# Patient Record
Sex: MALE | Race: BLACK OR AFRICAN AMERICAN | URBAN - METROPOLITAN AREA
[De-identification: names, ages, dates, MRNs, and addresses within clinical notes are randomized per-mention and may not be internally consistent; named-entity substitution may affect disease eponyms.]

---

## 2021-08-10 ENCOUNTER — EXTERNAL NURSING HOME DOCUMENTATION (OUTPATIENT)
Dept: INTERNAL MEDICINE CLINIC | Age: 56
End: 2021-08-10

## 2021-08-10 DIAGNOSIS — I50.9 CONGESTIVE HEART FAILURE, UNSPECIFIED HF CHRONICITY, UNSPECIFIED HEART FAILURE TYPE (HCC): ICD-10-CM

## 2021-08-10 DIAGNOSIS — F41.9 ANXIETY: Primary | ICD-10-CM

## 2021-08-10 DIAGNOSIS — F10.10 ETOH ABUSE: ICD-10-CM

## 2021-08-10 DIAGNOSIS — I73.9 PAD (PERIPHERAL ARTERY DISEASE) (HCC): Primary | ICD-10-CM

## 2021-08-10 DIAGNOSIS — E78.2 MIXED HYPERLIPIDEMIA: ICD-10-CM

## 2021-08-10 DIAGNOSIS — R52 PAIN: ICD-10-CM

## 2021-08-10 DIAGNOSIS — I73.1 THROMBOANGIITIS OBLITERANS (BUERGER'S DISEASE) (HCC): ICD-10-CM

## 2021-08-10 PROCEDURE — 99306 1ST NF CARE HIGH MDM 50: CPT | Performed by: INTERNAL MEDICINE

## 2021-08-10 RX ORDER — LORAZEPAM 0.5 MG/1
0.5 TABLET ORAL
Qty: 28 TABLET | Refills: 0 | Status: SHIPPED | OUTPATIENT
Start: 2021-08-10 | End: 2021-08-17

## 2021-08-10 RX ORDER — OXYCODONE AND ACETAMINOPHEN 5; 325 MG/1; MG/1
1 TABLET ORAL
Qty: 15 TABLET | Refills: 0 | Status: SHIPPED | OUTPATIENT
Start: 2021-08-10 | End: 2021-08-15

## 2021-08-10 NOTE — PROGRESS NOTES
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 on 07/22 with shortness of breath and bilateral lower extremity edema with concern for ischemia. The patient had a recent hospital stay where he was diagnosed with CHF and Buerger's disease and had bilateral lower extremity ulceration, right more than left and had multiple procedure with improvement in the wound. He also stopped taking all of his medications over the past two months and has been depressed. In the emergency room, he was found to have troponin of 0.072 and BNP 13,279. Cardiologist was consulted and echocardiogram done showed his ejection fraction 10 to 15% only with severe global hypokinesia, mild LVH and right ventricular systolic function reduction. Vascular consulted for bilateral lower extremity ischemia. ZOHREH was normal and ischemia is thought to be related to low flow severe cardiomyopathy. He was diuresed in the hospital and he was started on dobutamine drip, as well as heparin drip over night. The patient became very agitated and he received Ativan as needed. He had increased shortness of breath and was placed on BiPAP. He then had apneic episode required a sternal rub and that aroused the patient. He was transferred to ICU for further management. Cardiac cath was done showed normal coronary with mild diffuse disease. He was advised to follow up with cardiologist as an outpatient. For his depression he was seen by psychiatrist and low dose of sertraline started along with hydroxyzine. Has history of alcohol abuse. He was placed on Ativan as needed. He was also placed on nicotine patch. He was stabilized and was transferred to [facility].   He is doing well in the rehab, but complaining of leg pain and anxiety. Past Medical History:  Hypertension, history of alcohol abuse, peripheral vascular disease with Buerger's disease and depression. Past Surgical History:  Wound debridement. Allergies:  He is allergic to codeine and lisinopril. Social History:  The patient smokes every day for many years. He drinks alcohol regularly. He lives at home. Medications:  He is on folic acid 1 mg tablet once a day, thiamine 100 mg tablets once a day, spironolactone 25 mg once a day, Zoloft 50 mg half tablet daily, gabapentin 200 mg three times a day, losartan 12.5 mg once a day, Bumex 1 mg once a day, Zofran 4 mg every eight hours as needed, lorazepam 1 mg every four hours as needed, NicoDerm 14 mg patch every day, hydroxyzine 10 mg twice a day as needed, Imodium 4 mg every six hours as needed, potassium chloride 20 mEq once a day as needed, vitamin D3 1000 units once a day. She is also using lovastatin 20 mg at bedtime, aspirin 81 mg once a day, Toprol XL 12.5 mg once a day. Review of Systems:  Complete review of systems done. Right now essentially significant for leg pain and anxiety. Physical Examination:    General:  This is a pleasant male, not in apparent distress. VITALS:  T:  98 degrees Fahrenheit. P:  70 per minute. BP:  120/80 mmHg. SaO2:  95% on room air. HEENT:  No abnormality detected. NECK:  Supple, no JVD, no carotid bruits, no thyromegaly. CHEST:  Chest is clear to auscultation bilaterally. No rales, no rhonchi. CARDIOVASCULAR:  S1, S2 normal.  Regular rate and rhythm. ABDOMEN:  Soft, nontender, nondistended, bowel sounds present. EXTREMITIES:  No leg edema present. Dorsalis pedis pulse with a poor flow. NEUROLOGICAL:  Alert and oriented x3. Cranial nerves II through XII grossly intact. Motor 5/5 bilaterally. Sensory within normal limits. Assessment and Plan:   1.  Acute hypoxic respiratory failure status post BiPAP placement. Right now off of BiPAP. He is breathing great. 2. Acute systolic heart failure with reduced ejection fraction. Recent echocardiogram showed ejection fraction 10 to 15%. He has ischemic cardiomyopathy. Cardiac cath was negative. He was continued with metoprolol, losartan and spironolactone. Lisinopril was discontinued due to allergy. The patient may need cardiac MRI, but he will follow up with cardiologist as an outpatient. 3. Non-sustained SVT. The patient to maintain magnesium level over 2 and potassium level has to be over 4.   4. Non-ST elevation MI. Cardiac cath done showed no occlusion. Medical management. He is to continue aspirin and statin and beta-blocker. 5. Severe PVD with Buerger's disease with foot ulceration. The patient will be on aspirin, gabapentin and Percocet as needed. 6. Depression. The patient is placed on low dose sertraline. We will continue it. We will also put him on lorazepam 0.5 mg every six hours as needed. 7. Alcohol abuse. The patient is doing well. He will be on lorazepam as needed. He is also on thiamine and now folic acid. THIS IS NOT A COMPLETE MEDICAL RECORD ON THIS PATIENT.    THIS IS A PATIENT AT [facility].    PLEASE CONTACT THE FACILITY LISTED BELOW FOR MORE INFORMATION ON THIS PATIENT.    THE COMPLETE RECORD RESIDES WITH THIS LONG TERM CARE FACILITY

## 2021-08-10 NOTE — TELEPHONE ENCOUNTER
Nurse at [facility] requests medication refill. Patient currently under care of Dr. Domingo Goel at [facility].

## 2021-08-20 ENCOUNTER — OFFICE VISIT (OUTPATIENT)
Dept: INTERNAL MEDICINE CLINIC | Age: 56
End: 2021-08-20

## 2021-08-20 DIAGNOSIS — K08.89 PAIN, DENTAL: Primary | ICD-10-CM

## 2021-08-20 DIAGNOSIS — R52 PAIN: ICD-10-CM

## 2021-08-20 DIAGNOSIS — I73.1 THROMBOANGIITIS OBLITERANS (BUERGER'S DISEASE) (HCC): ICD-10-CM

## 2021-08-20 DIAGNOSIS — I73.9 PAD (PERIPHERAL ARTERY DISEASE) (HCC): ICD-10-CM

## 2021-08-20 DIAGNOSIS — I50.9 CONGESTIVE HEART FAILURE, UNSPECIFIED HF CHRONICITY, UNSPECIFIED HEART FAILURE TYPE (HCC): ICD-10-CM

## 2021-08-20 DIAGNOSIS — E78.2 MIXED HYPERLIPIDEMIA: ICD-10-CM

## 2021-08-20 DIAGNOSIS — F41.9 ANXIETY: ICD-10-CM

## 2021-08-20 DIAGNOSIS — F10.10 ETOH ABUSE: ICD-10-CM

## 2021-08-20 PROCEDURE — 99309 SBSQ NF CARE MODERATE MDM 30: CPT | Performed by: NURSE PRACTITIONER

## 2021-08-20 RX ORDER — OXYCODONE AND ACETAMINOPHEN 5; 325 MG/1; MG/1
1 TABLET ORAL
Qty: 30 TABLET | Refills: 0 | Status: SHIPPED | OUTPATIENT
Start: 2021-08-20 | End: 2021-09-03 | Stop reason: SDUPTHER

## 2021-08-20 RX ORDER — LORAZEPAM 0.5 MG/1
0.5 TABLET ORAL
Qty: 45 TABLET | Refills: 0 | Status: SHIPPED | OUTPATIENT
Start: 2021-08-20 | End: 2021-09-03 | Stop reason: SDUPTHER

## 2021-08-20 NOTE — PROGRESS NOTES
THIS IS NOT A COMPLETE MEDICAL RECORD ON THIS PATIENT. THIS IS A PATIENT AT [facility]. PLEASE CONTACT THE FACILITY LISTED BELOW FOR MORE INFORMATION ON THIS PATIENT. THE COMPLETE RECORD RESIDES WITH THIS LONG TERM CARE FACILITY. HISTORY OF PRESENT ILLNESS  Kevni Dunham is a 54 y.o. male. This patient is currently under the care of Dr. Juan Le at [facility].  The patient was admitted to this facility following hospitalization related to acute respiratory failure, NSTEMI, CHF, and non-sustained SVT. His past medical history includes severe PVD with Buerger's disease, CHF, HTN, depression, tobacco use, alcohol abuse  The patient is seen today per nursing request.  He has had complaints of tooth pain. The patient says he has history of cavity to left lower molar. He was chewing on ice recently and began to experience more pain. He has no drainage, redness, or swelling in mouth. Patient also has complaints of ongoing leg pain and intermittent anxiety. He requests refills of PRN Percocet and Ativan. HPI    Review of Systems   Constitutional: Negative for chills and fever. HENT: Negative for congestion. Tooth pain   Respiratory: Negative for cough and shortness of breath. Cardiovascular: Negative for chest pain. Gastrointestinal: Negative. Genitourinary: Negative. Musculoskeletal: Positive for joint pain and myalgias. Neurological: Negative for headaches. Endo/Heme/Allergies: Negative. Psychiatric/Behavioral: The patient is nervous/anxious. Physical Exam  Constitutional:       General: He is not in acute distress. HENT:      Head: Normocephalic and atraumatic. Nose: No congestion.       Mouth/Throat:      Mouth: Mucous membranes are moist.      Comments: Multiple dental caries noted  No swelling, erythema, or drainage noted in mouth   Eyes:      Conjunctiva/sclera: Conjunctivae normal.   Cardiovascular:      Rate and Rhythm: Normal rate and regular rhythm. Pulmonary:      Effort: Pulmonary effort is normal.      Breath sounds: Normal breath sounds. Abdominal:      General: Bowel sounds are normal. There is no distension. Palpations: Abdomen is soft. Tenderness: There is no abdominal tenderness. Musculoskeletal:      Right lower leg: No edema. Left lower leg: No edema. Skin:     General: Skin is warm and dry. Neurological:      Mental Status: He is alert. Psychiatric:         Mood and Affect: Mood normal.         ASSESSMENT and PLAN  Encounter Diagnoses   Name Primary?  Pain, dental Yes    PAD (peripheral artery disease) (HCC)     Thromboangi[facility]erger's [facility]) [facility]     Mixed hyperlipidemia     Congestive heart failure, unspecified HF chronicity, unspecified heart failure type ([facility]     ETOH abuse     Anxiety     Pain      Refer to dentistry. Discussed s/sx of dental infection- patient to notify nursing of worsening symptoms/ sign of infection. Will refill:  Percocet 5-325 mg po q8h PRN pain x 14 days. Ativan 0.5 mg po q6h PRN anxiety x 14 days. Continue PT/OT as tolerated. Nursing to continue to monitor closely and notify MD/NP of any change in condition.

## 2021-08-27 ENCOUNTER — EXTERNAL NURSING HOME DOCUMENTATION (OUTPATIENT)
Dept: INTERNAL MEDICINE CLINIC | Age: 56
End: 2021-08-27

## 2021-08-27 DIAGNOSIS — E78.2 MIXED HYPERLIPIDEMIA: ICD-10-CM

## 2021-08-27 DIAGNOSIS — I73.9 PAD (PERIPHERAL ARTERY DISEASE) (HCC): ICD-10-CM

## 2021-08-27 DIAGNOSIS — I73.1 THROMBOANGIITIS OBLITERANS (BUERGER'S DISEASE) (HCC): ICD-10-CM

## 2021-08-27 DIAGNOSIS — I50.9 CONGESTIVE HEART FAILURE, UNSPECIFIED HF CHRONICITY, UNSPECIFIED HEART FAILURE TYPE (HCC): Primary | ICD-10-CM

## 2021-08-27 PROCEDURE — 99309 SBSQ NF CARE MODERATE MDM 30: CPT | Performed by: INTERNAL MEDICINE

## 2021-08-27 NOTE — PROGRESS NOTES
Progress Note     Subjective:  Mr. Erika Bowles is doing well. He has been gaining weight. He was initially 192 pounds, right now he is 200 pounds, but he is eating double portion and having exercise regularly. No leg swelling or shortness of breath or orthopnea. Otherwise, he is doing well. Objective:    VITALS:  T:  98.2 degrees Fahrenheit. P:  100 per minute. BP:  124/74 mmHg. SaO2:  98% on room air. Weight is 200 pounds today. HEENT:  No abnormality detected. NECK:  Supple, no JVD, no carotid bruits, no thyromegaly. CHEST:  Chest is clear to auscultation bilaterally. No rales, no rhonchi. CARDIOVASCULAR:  S1, S2 normal.  Regular rate and rhythm. ABDOMEN:  Soft, nontender, nondistended, bowel sounds present. EXTREMITIES:  No edema is noted. Dorsalis pedis pulse normal.    NEUROLOGICAL:  Alert and oriented x3. No neurological deficits. Laboratory Data:   Recent labs were done. CMP showed normal.      Assessment and Plan:   1. Acute systolic heart failure with reduced ejection fraction. The patient has cardiomyopathy. He is on Bumex 1 mg once a day. We will not change dosage of medication. He is on salt restriction and fluid restriction. He is also no spironolactone. 2. Non-ST elevation MI. Cardiac evaluation did not show any occlusion. The patient is on aspirin, statin and beta-blocker. We will continue it. 3. Non-sustained SVT. The patient's lithium level is normal right now. No tachycardia right now. 4. Severe PVD with Buerger's disease. The patient is on aspirin, gabapentin and Percocet as needed, doing well. 5. Depression. The patient is stable right now. He is on sertraline. 6. Alcohol abuse. He is on thiamine and folic acid. No alcohol withdrawal right now. THIS IS NOT A COMPLETE MEDICAL RECORD ON THIS PATIENT.    THIS IS A PATIENT AT [facility].    PLEASE CONTACT THE FACILITY LISTED BELOW FOR MORE INFORMATION ON THIS PATIENT.    THE COMPLETE RECORD Tylenol 650 mg p o  Q 6 hours as needed for mild pain  Oxycodone 5 mg p o  Q 4 hours for moderate pain RESIDES WITH THIS LONG TERM CARE FACILITY Detail Level: Detailed Quality 402: Tobacco Use And Help With Quitting Among Adolescents: Patient screened for tobacco and never smoked Quality 130: Documentation Of Current Medications In The Medical Record: Current Medications Documented

## 2021-09-03 ENCOUNTER — OFFICE VISIT (OUTPATIENT)
Dept: INTERNAL MEDICINE CLINIC | Age: 56
End: 2021-09-03

## 2021-09-03 DIAGNOSIS — R52 PAIN: ICD-10-CM

## 2021-09-03 DIAGNOSIS — F41.9 ANXIETY: ICD-10-CM

## 2021-09-03 DIAGNOSIS — E78.2 MIXED HYPERLIPIDEMIA: ICD-10-CM

## 2021-09-03 DIAGNOSIS — F10.10 ETOH ABUSE: ICD-10-CM

## 2021-09-03 DIAGNOSIS — I50.9 CONGESTIVE HEART FAILURE, UNSPECIFIED HF CHRONICITY, UNSPECIFIED HEART FAILURE TYPE (HCC): Primary | ICD-10-CM

## 2021-09-03 DIAGNOSIS — I73.9 PAD (PERIPHERAL ARTERY DISEASE) (HCC): ICD-10-CM

## 2021-09-03 PROCEDURE — 99309 SBSQ NF CARE MODERATE MDM 30: CPT | Performed by: NURSE PRACTITIONER

## 2021-09-03 RX ORDER — OXYCODONE AND ACETAMINOPHEN 5; 325 MG/1; MG/1
1 TABLET ORAL
Qty: 15 TABLET | Refills: 0 | Status: SHIPPED | OUTPATIENT
Start: 2021-09-03 | End: 2021-09-10

## 2021-09-03 RX ORDER — BUMETANIDE 1 MG/1
1 TABLET ORAL DAILY
Qty: 30 TABLET | Refills: 0 | Status: SHIPPED | OUTPATIENT
Start: 2021-09-03 | End: 2022-08-29

## 2021-09-03 RX ORDER — LOVASTATIN 20 MG/1
20 TABLET ORAL
Qty: 30 TABLET | Refills: 0 | Status: SHIPPED | OUTPATIENT
Start: 2021-09-03 | End: 2022-08-29 | Stop reason: SDUPTHER

## 2021-09-03 RX ORDER — SPIRONOLACTONE 25 MG/1
25 TABLET ORAL DAILY
Qty: 30 TABLET | Refills: 0 | Status: SHIPPED | OUTPATIENT
Start: 2021-09-03 | End: 2022-08-29 | Stop reason: SDUPTHER

## 2021-09-03 RX ORDER — METOPROLOL SUCCINATE 25 MG/1
25 TABLET, EXTENDED RELEASE ORAL DAILY
Qty: 30 TABLET | Refills: 0 | Status: SHIPPED | OUTPATIENT
Start: 2021-09-03 | End: 2022-08-29

## 2021-09-03 RX ORDER — GUAIFENESIN 100 MG/5ML
81 LIQUID (ML) ORAL DAILY
Qty: 30 TABLET | Refills: 0 | Status: SHIPPED | OUTPATIENT
Start: 2021-09-03 | End: 2022-08-29 | Stop reason: SDUPTHER

## 2021-09-03 RX ORDER — IBUPROFEN 200 MG
1 TABLET ORAL EVERY 24 HOURS
Qty: 30 PATCH | Refills: 0 | Status: SHIPPED | OUTPATIENT
Start: 2021-09-03 | End: 2021-10-03

## 2021-09-03 RX ORDER — LOSARTAN POTASSIUM 25 MG/1
12.5 TABLET ORAL DAILY
Qty: 15 TABLET | Refills: 0 | Status: SHIPPED | OUTPATIENT
Start: 2021-09-03 | End: 2022-08-29

## 2021-09-03 RX ORDER — SERTRALINE HYDROCHLORIDE 25 MG/1
25 TABLET, FILM COATED ORAL DAILY
Qty: 30 TABLET | Refills: 0 | Status: SHIPPED | OUTPATIENT
Start: 2021-09-03 | End: 2022-08-29

## 2021-09-03 RX ORDER — GABAPENTIN 300 MG/1
300 CAPSULE ORAL 3 TIMES DAILY
Qty: 90 CAPSULE | Refills: 0 | Status: SHIPPED | OUTPATIENT
Start: 2021-09-03 | End: 2022-08-29

## 2021-09-03 RX ORDER — LORAZEPAM 0.5 MG/1
0.5 TABLET ORAL
Qty: 15 TABLET | Refills: 0 | Status: SHIPPED | OUTPATIENT
Start: 2021-09-03 | End: 2021-09-17

## 2021-09-03 NOTE — PROGRESS NOTES
THIS IS NOT A COMPLETE MEDICAL RECORD ON THIS PATIENT. THIS IS A PATIENT AT [facility]. PLEASE CONTACT THE FACILITY LISTED BELOW FOR MORE INFORMATION ON THIS PATIENT. THE COMPLETE RECORD RESIDES WITH THIS LONG TERM CARE FACILITY. HISTORY OF PRESENT ILLNESS  Wilbert White is a 54 y.o. male. This patient is currently under the care of Dr. Rupert Gauthier at [facility].  The patient was admitted to this facility following hospitalization related to acute respiratory failure, NSTEMI, CHF, and non-sustained SVT. His past medical history includes severe PVD with Buerger's disease, CHF, HTN, depression, tobacco use, alcohol abuse  Per social work, the patient is scheduled to discharge 09/07/21. Patient states he is feeling well, but continues to feel that his strength is not to his baseline. 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 Discussed with therapy provider that goals are being reached for discharge from skilled therapy services. Reviewed with patient that he may continue with home health physical therapy at discharge for further strengthening. Patient indicates that he may prefer outpatient physical therapy services instead, as he has a very small living environment. Advised patient to follow-up with PCP/ specialists regarding further recommendations on return to work after discharge as he begins outpatient therapy course. Patient agreeable, but notes he has not yet seen the new PCP he has been assigned to. Encouraged patient to reach out to schedule appointment with PCP. HPI    Review of Systems   Constitutional: Negative for chills and fever. HENT: Negative for congestion. Tooth pain   Respiratory: Negative for cough and shortness of breath. Cardiovascular: Negative for chest pain. Gastrointestinal: Negative. Genitourinary: Negative. Musculoskeletal: Positive for joint pain and myalgias.    Neurological: Negative for headaches. Endo/Heme/Allergies: Negative. Psychiatric/Behavioral: The patient is nervous/anxious. Physical Exam  Constitutional:       General: He is not in acute distress. HENT:      Head: Normocephalic and atraumatic. Nose: No congestion. Mouth/Throat:      Mouth: Mucous membranes are moist.   Eyes:      Conjunctiva/sclera: Conjunctivae normal.   Cardiovascular:      Rate and Rhythm: Normal rate and regular rhythm. Pulmonary:      Effort: Pulmonary effort is normal.      Breath sounds: Normal breath sounds. Abdominal:      General: Bowel sounds are normal. There is no distension. Palpations: Abdomen is soft. Tenderness: There is no abdominal tenderness. Musculoskeletal:      Right lower leg: No edema. Left lower leg: No edema. Skin:     General: Skin is warm and dry. Neurological:      Mental Status: He is alert. Psychiatric:         Mood and Affect: Mood normal.         ASSESSMENT and PLAN  Encounter Diagnoses   Name Primary?  Congestive heart failure, unspecified HF chronicity, unspecified heart failure type ([address] Yes    PAD (peripheral artery disease) [facility]     Mixed hyperlipidemia     ETOH abuse     Anxiety      Patient to have home health services upon discharge. Patient would like to consider this further. May provide order for outpatient PT services at discharge if patient refuses home health services. Will provide prescriptions for 30 day supply of medications at discharge. Orders Placed This Encounter    aspirin 81 mg chewable tablet     Sig: Take 1 Tablet by mouth daily. Dispense:  30 Tablet     Refill:  0    LORazepam (ATIVAN) 0.5 mg tablet     Sig: Take 1 Tablet by mouth every six (6) hours as needed for Anxiety for up to 14 days. Max Daily Amount: 2 mg. Dispense:  15 Tablet     Refill:  0    bumetanide (BUMEX) 1 mg tablet     Sig: Take 1 Tablet by mouth daily.      Dispense:  30 Tablet     Refill:  0    gabapentin (NEURONTIN) 300 mg capsule     Sig: Take 1 Capsule by mouth three (3) times daily. Max Daily Amount: 900 mg. Dispense:  90 Capsule     Refill:  0    losartan (COZAAR) 25 mg tablet     Sig: Take 0.5 Tablets by mouth daily. Dispense:  15 Tablet     Refill:  0    lovastatin (MEVACOR) 20 mg tablet     Sig: Take 1 Tablet by mouth nightly. Dispense:  30 Tablet     Refill:  0    metoprolol succinate (TOPROL-XL) 25 mg XL tablet     Sig: Take 1 Tablet by mouth daily. Dispense:  30 Tablet     Refill:  0    nicotine (NICODERM CQ) 14 mg/24 hr patch     Si Patch by TransDERmal route every twenty-four (24) hours for 30 days. Dispense:  30 Patch     Refill:  0    oxyCODONE-acetaminophen (PERCOCET) 5-325 mg per tablet     Sig: Take 1 Tablet by mouth every eight (8) hours as needed for Pain for up to 7 days. Max Daily Amount: 3 Tablets. Dispense:  15 Tablet     Refill:  0     Patient of [facility]    sertraline (ZOLOFT) 25 mg tablet     Sig: Take 1 Tablet by mouth daily. Dispense:  30 Tablet     Refill:  0    spironolactone (ALDACTONE) 25 mg tablet     Sig: Take 1 Tablet by mouth daily. Dispense:  30 Tablet     Refill:  0       Patient to establish with PCP within 2 weeks of discharge and specialists as scheduled. Nursing to continue to monitor closely and notify MD/NP of any change in condition prior to discharge.

## 2021-09-07 ENCOUNTER — EXTERNAL NURSING HOME DOCUMENTATION (OUTPATIENT)
Dept: INTERNAL MEDICINE CLINIC | Age: 56
End: 2021-09-07

## 2021-09-07 DIAGNOSIS — I73.9 PAD (PERIPHERAL ARTERY DISEASE) (HCC): ICD-10-CM

## 2021-09-07 DIAGNOSIS — G62.9 NEUROPATHY: Primary | ICD-10-CM

## 2021-09-07 DIAGNOSIS — F10.10 ETOH ABUSE: ICD-10-CM

## 2021-09-07 DIAGNOSIS — E78.2 MIXED HYPERLIPIDEMIA: ICD-10-CM

## 2021-09-07 DIAGNOSIS — I50.9 CONGESTIVE HEART FAILURE, UNSPECIFIED HF CHRONICITY, UNSPECIFIED HEART FAILURE TYPE (HCC): ICD-10-CM

## 2021-09-07 PROCEDURE — 99309 SBSQ NF CARE MODERATE MDM 30: CPT | Performed by: INTERNAL MEDICINE

## 2021-09-07 NOTE — PROGRESS NOTES
Progress Note     Subjective:  Mr. Mikaela Medrano is doing well. He is staying in Rehab right now, noted to have tingling sensation and foot pain in both feet. No redness or swelling present. He has gained weight because he is eating double. He is feeling better, but still feels tired. Therapy finished, but he staying a little longer to get strong. Objective:    VITALS:  T:  97.6 degrees Fahrenheit. P:  79 per minute. BP:  110/69 mmHg. SaO2:  98% on room air. Weight is 207 pounds. HEENT:  No abnormality detected. NECK:  Supple, no JVD, no carotid bruits, no thyromegaly. CHEST:  Chest is clear to auscultation bilaterally. No rales, no rhonchi. CARDIOVASCULAR:  S1, S2 normal.  Regular rate and rhythm. ABDOMEN:  Soft, nontender, nondistended, bowel sounds present. EXTREMITIES:  No edema is noted. Dorsalis pedis pulse normal.  No leg swelling present. No redness. Tenderness present. NEUROLOGICAL:  Alert and oriented x2. No neurological deficits. Assessment and Plan:   1. Peripheral neuropathy. He was an alcoholic in the past.  I will put him on B12 500 mcg once a day for three months. He should do foot exercise. 2. Systolic heart failure with reduced ejection fraction. He has gained weight, but he is compensated. No heart failure right now. We will continue losartan, metoprolol and spironolactone. 3. Non-ST elevation MI. Cardiac cath showed no occlusion. The patient is on aspirin, statin and beta-blocker. We will continue it. 4. Gait weakness. The patient to continue therapy. 5. Severe PVD with Buerger's disease. The patient is on aspirin, statin and Percocet. We will continue it. 6. Depression. On low dose sertraline, doing well. THIS IS NOT A COMPLETE MEDICAL RECORD ON THIS PATIENT.    THIS IS A PATIENT AT [facility].    PLEASE CONTACT THE FACILITY LISTED BELOW FOR MORE INFORMATION ON THIS PATIENT.    THE COMPLETE RECORD RESIDES WITH THIS LONG TERM CARE FACILITY

## 2021-09-27 ENCOUNTER — OFFICE VISIT (OUTPATIENT)
Dept: INTERNAL MEDICINE CLINIC | Age: 56
End: 2021-09-27
Payer: MEDICAID

## 2021-09-27 DIAGNOSIS — I73.9 PAD (PERIPHERAL ARTERY DISEASE) (HCC): ICD-10-CM

## 2021-09-27 DIAGNOSIS — R73.09 ELEVATED GLUCOSE LEVEL: Primary | ICD-10-CM

## 2021-09-27 DIAGNOSIS — I73.1 THROMBOANGIITIS OBLITERANS (BUERGER'S DISEASE) (HCC): ICD-10-CM

## 2021-09-27 DIAGNOSIS — E78.2 MIXED HYPERLIPIDEMIA: ICD-10-CM

## 2021-09-27 DIAGNOSIS — I50.9 CONGESTIVE HEART FAILURE, UNSPECIFIED HF CHRONICITY, UNSPECIFIED HEART FAILURE TYPE (HCC): ICD-10-CM

## 2021-09-27 PROCEDURE — 99308 SBSQ NF CARE LOW MDM 20: CPT | Performed by: NURSE PRACTITIONER

## 2021-09-27 NOTE — PROGRESS NOTES
THIS IS NOT A COMPLETE MEDICAL RECORD ON THIS PATIENT. THIS IS A PATIENT AT [facility]. PLEASE CONTACT THE FACILITY LISTED BELOW FOR MORE INFORMATION ON THIS PATIENT. THE COMPLETE RECORD RESIDES WITH THIS LONG TERM CARE FACILITY. HISTORY OF PRESENT ILLNESS  Lori Medina is a 54 y.o. male. This patient is currently under the care of Dr. Yani Enamorado at [facility].  The patient was admitted to this facility following hospitalization related to acute respiratory failure, NSTEMI, CHF, and non-sustained SVT. His past medical history includes severe PVD with Buerger's disease, CHF, HTN, depression, tobacco use, and alcohol abuse. The patient is seen today per nursing request for lab review. Labs collected 09/24/21:  BUN-15, Creatinine-0.85, Na-139, K-4.2, WBC- 6.9, H&H-14.6/43.7  Of note, patient's glucose was 160 at time of labs. Patient states he was told in the past that he had pre-diabetes. He has been eating more sweets, like cookies recently, and acknowledges he should cut back. HPI    Review of Systems   Constitutional: Negative for chills and fever. HENT: Negative for congestion. Respiratory: Negative for cough and shortness of breath. Cardiovascular: Negative for chest pain. Gastrointestinal: Negative. Genitourinary: Negative. Neurological: Negative for headaches. Endo/Heme/Allergies: Negative. Physical Exam  Constitutional:       General: He is not in acute distress. Comments: ambulatory   HENT:      Head: Normocephalic and atraumatic. Eyes:      Conjunctiva/sclera: Conjunctivae normal.   Cardiovascular:      Rate and Rhythm: Normal rate and regular rhythm. Pulmonary:      Effort: Pulmonary effort is normal.      Breath sounds: Normal breath sounds. Abdominal:      General: There is no distension. Musculoskeletal:      Right lower leg: No edema. Left lower leg: No edema. Skin:     General: Skin is warm and dry.    Neurological: Mental Status: He is alert. Psychiatric:         Mood and Affect: Mood normal.         ASSESSMENT and PLAN  Encounter Diagnoses   Name Primary?  Elevated glucose level Yes    Congestive heart failure, unspecified HF chronicity, unspecified heart failure type ([facility]     PAD (peripheral artery disease) ([facility])     Thromboangiitis obliterans (Buerger's disease) ([facility])     Mixed hyperlipidemia      HgbA1c collected this morning, await results. Reviewed medications and side effects in detail. Continue current medications at this time. Nursing to continue to monitor closely and notify MD/NP of any change in condition.

## 2021-10-01 DIAGNOSIS — R52 PAIN: Primary | ICD-10-CM

## 2021-10-01 RX ORDER — OXYCODONE AND ACETAMINOPHEN 5; 325 MG/1; MG/1
1 TABLET ORAL
Qty: 20 TABLET | Refills: 0 | Status: SHIPPED | OUTPATIENT
Start: 2021-10-01 | End: 2021-10-08

## 2021-10-15 ENCOUNTER — OFFICE VISIT (OUTPATIENT)
Dept: INTERNAL MEDICINE CLINIC | Age: 56
End: 2021-10-15
Payer: MEDICAID

## 2021-10-15 DIAGNOSIS — K04.7 DENTAL INFECTION: Primary | ICD-10-CM

## 2021-10-15 PROCEDURE — 99308 SBSQ NF CARE LOW MDM 20: CPT | Performed by: NURSE PRACTITIONER

## 2021-10-15 NOTE — PROGRESS NOTES
THIS IS NOT A COMPLETE MEDICAL RECORD ON THIS PATIENT. THIS IS A PATIENT AT [facility]. PLEASE CONTACT THE FACILITY LISTED BELOW FOR MORE INFORMATION ON THIS PATIENT. THE COMPLETE RECORD RESIDES WITH THIS LONG TERM CARE FACILITY. HISTORY OF PRESENT ILLNESS  Sophia Mata is a 54 y.o. male. This patient is currently under the care of Dr. Fanny Bhatt at [facility].  The patient was admitted to this facility following hospitalization related to acute respiratory failure, NSTEMI, CHF, and non-sustained SVT. His past medical history includes severe PVD with Buerger's disease, CHF, HTN, depression, tobacco use, alcohol abuse  The patient is seen today per his request.  He has complaints of tooth pain, on the left lower portion of his mouth. He was previously treated in August 2021 for dental infection and referred to dentistry. However, he says he presented for appointment and his insurance was not accepted, so he was not seen. Over the last couple of days pain has worsened again. No swelling of cheek. No fever or chills. HPI    Review of Systems   Constitutional: Negative for chills and fever. HENT: Negative for congestion. Dental pain   Respiratory: Negative for cough and shortness of breath. Cardiovascular: Negative for chest pain. Physical Exam  Constitutional:       General: He is not in acute distress. Comments: ambulatory   HENT:      Head: Normocephalic and atraumatic. Eyes:      Conjunctiva/sclera: Conjunctivae normal.   Musculoskeletal:      Right lower leg: No edema. Left lower leg: No edema. Skin:     General: Skin is warm and dry. Neurological:      Mental Status: He is alert. Psychiatric:         Mood and Affect: Mood normal.         ASSESSMENT and PLAN  Encounter Diagnoses   Name Primary?  Dental infection Yes     Will order  Amoxicillin 500 mg po tid x 10 days. Dentistry appointment recommended. Orajel tid PRN.     Reviewed medications and side effects in detail. Nursing to continue to monitor closely and notify MD/NP of any change in condition.

## 2021-10-26 ENCOUNTER — EXTERNAL NURSING HOME DOCUMENTATION (OUTPATIENT)
Dept: INTERNAL MEDICINE CLINIC | Age: 56
End: 2021-10-26
Payer: MEDICAID

## 2021-10-26 DIAGNOSIS — I25.10 CORONARY ARTERY DISEASE INVOLVING NATIVE HEART WITHOUT ANGINA PECTORIS, UNSPECIFIED VESSEL OR LESION TYPE: ICD-10-CM

## 2021-10-26 DIAGNOSIS — M25.511 ACUTE PAIN OF RIGHT SHOULDER: Primary | ICD-10-CM

## 2021-10-26 DIAGNOSIS — I73.9 PAD (PERIPHERAL ARTERY DISEASE) (HCC): ICD-10-CM

## 2021-10-26 DIAGNOSIS — I50.9 CONGESTIVE HEART FAILURE, UNSPECIFIED HF CHRONICITY, UNSPECIFIED HEART FAILURE TYPE (HCC): ICD-10-CM

## 2021-10-26 DIAGNOSIS — G62.9 NEUROPATHY: ICD-10-CM

## 2021-10-26 PROCEDURE — 99309 SBSQ NF CARE MODERATE MDM 30: CPT | Performed by: INTERNAL MEDICINE

## 2021-10-26 NOTE — PROGRESS NOTES
Progress Note     Subjective:  Mr. Kyle Correia is doing well, but reports neuropathic pain in both legs which is not getting better. He is placed on Lyrica 50 mg three times a day. Pain is still there. He is not in physical therapy right now because insurance is not covering. He is upset about his neuropathic pain. Also reports neck pain radiating to the right side of the shoulder for the last several days. He is sleeping on his right side and that is probably causing pain. Otherwise he is doing well, eating well too. Objective:    VITALS:  T:  98.4 degrees Fahrenheit. P:  78 per minute. BP:  135/80 mmHg. SaO2:  97% on room air. Weight is 228 pounds. HEENT:  No abnormality detected. NECK:  Supple, no JVD, no carotid bruits, no thyromegaly. Spine, nontender. Mild muscle spasm present on the right side. Range of motion mildly restricted. Right shoulder:  Mild tenderness on the shoulder joint. Range of motion mildly restricted. CHEST:  Chest is clear to auscultation bilaterally. No rales, no rhonchi. CARDIOVASCULAR:  S1, S2 normal.  Regular rate and rhythm. ABDOMEN:  Soft, nontender, nondistended, bowel sounds present. EXTREMITIES:  No edema noticed. Dorsalis pedis pulse normal.  NEUROLOGICAL:  Alert and oriented x3. No neurological deficit. Assessment and Plan:   1. Acute right shoulder joint pain. Probably positional.  We put him on naproxen 500 mg twice a day for one week with food. He is to sleep on left side. He will try to massage the area. 2. Peripheral neuropathy. He was an alcoholic. He is on B12 500 mcg a day. We will increase Lyrica to 75 mg three times a day. 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 Advised him to talk to his HR and see if he can do temporary disability. 3. Coronary artery disease. The patient is on aspirin, statin and beta-blocker doing well. 4. Congestive heart failure, systolic.   He is compensated taking losartan, metoprolol, and spironolactone. 5. Severe PVD with Buerger's disease, on aspirin, statin seen by vascular surgeon, doing well. 6. Depression on Cyclizine doing well. THIS IS NOT A COMPLETE MEDICAL RECORD ON THIS PATIENT.    THIS IS A PATIENT AT [facility].    PLEASE CONTACT THE FACILITY LISTED BELOW FOR MORE INFORMATION ON THIS PATIENT.    THE COMPLETE RECORD RESIDES WITH THIS LONG TERM CARE FACILITY

## 2021-11-08 ENCOUNTER — OFFICE VISIT (OUTPATIENT)
Dept: INTERNAL MEDICINE CLINIC | Age: 56
End: 2021-11-08
Payer: MEDICAID

## 2021-11-08 DIAGNOSIS — I50.9 CONGESTIVE HEART FAILURE, UNSPECIFIED HF CHRONICITY, UNSPECIFIED HEART FAILURE TYPE (HCC): ICD-10-CM

## 2021-11-08 DIAGNOSIS — I25.10 CORONARY ARTERY DISEASE INVOLVING NATIVE HEART WITHOUT ANGINA PECTORIS, UNSPECIFIED VESSEL OR LESION TYPE: ICD-10-CM

## 2021-11-08 DIAGNOSIS — G62.9 NEUROPATHY: Primary | ICD-10-CM

## 2021-11-08 DIAGNOSIS — G89.29 OTHER CHRONIC PAIN: ICD-10-CM

## 2021-11-08 DIAGNOSIS — I73.9 PAD (PERIPHERAL ARTERY DISEASE) (HCC): ICD-10-CM

## 2021-11-08 PROCEDURE — 99308 SBSQ NF CARE LOW MDM 20: CPT | Performed by: NURSE PRACTITIONER

## 2021-11-08 NOTE — PROGRESS NOTES
THIS IS NOT A COMPLETE MEDICAL RECORD ON THIS PATIENT. THIS IS A PATIENT AT [facility]. PLEASE CONTACT THE FACILITY LISTED BELOW FOR MORE INFORMATION ON THIS PATIENT. THE COMPLETE RECORD RESIDES WITH THIS LONG TERM CARE FACILITY. HISTORY OF PRESENT ILLNESS  Leora Robin is a 54 y.o. male. This patient is currently under the care of Dr. Savannah Preciado at [facility].  The patient was admitted to this facility following hospitalization related to acute respiratory failure, NSTEMI, CHF, and non-sustained SVT. His past medical history includes severe PVD with Buerger's disease, CHF, HTN, neuropathy, chronic pain, depression, tobacco use, alcohol abuse  The patient is seen today per his request. Patient was previously transitioned from gabapentin to Lyrica for neuropathic pain. He notes he has had increased numbness and tingling to his feet bilaterally as well as back and neck pain since transition. At time of recent visit with Dr. Savannah Preciado 10/26/21, his Lyrica dosage was increased to 75 mg po tid. However, patient has ongoing pain, which he believes was better controlled on gabapentin. He would like to stop Lyrica and resume gabapentin. HPI    Review of Systems   Constitutional: Negative for chills and fever. HENT: Negative for congestion. Respiratory: Negative for cough and shortness of breath. Cardiovascular: Negative for chest pain. Gastrointestinal: Negative. Genitourinary: Negative. Musculoskeletal: Positive for back pain, joint pain, myalgias and neck pain. Neuropathy in feet   Neurological: Positive for tingling. Negative for tremors, focal weakness, seizures and headaches. Endo/Heme/Allergies: Negative. Physical Exam  Constitutional:       General: He is not in acute distress. HENT:      Head: Normocephalic and atraumatic.    Eyes:      Conjunctiva/sclera: Conjunctivae normal.   Cardiovascular:      Pulses:           Dorsalis pedis pulses are 2+ on the right side and 2+ on the left side. Musculoskeletal:      Right lower leg: No edema. Left lower leg: No edema. Skin:     General: Skin is warm and dry. Neurological:      Mental Status: He is alert. Psychiatric:         Mood and Affect: Mood normal.         ASSESSMENT and PLAN  Encounter Diagnoses   Name Primary?  Neuropathy Yes    Other chronic pain     Congestive heart failure, unspecified HF chronicity, unspecified heart failure type ([address]     PAD (peripheral artery disease) ([address]     Coronary artery disease involving native heart without angina pectoris, unspecified vessel or lesion type      D/c Lyrica. Resume gabapentin 300 mg po tid. Reviewed medications and side effects in detail. Nursing to continue to monitor closely and notify MD/NP of any change in condition.

## 2021-11-19 ENCOUNTER — OFFICE VISIT (OUTPATIENT)
Dept: INTERNAL MEDICINE CLINIC | Age: 56
End: 2021-11-19
Payer: MEDICAID

## 2021-11-19 DIAGNOSIS — G47.00 INSOMNIA, UNSPECIFIED TYPE: ICD-10-CM

## 2021-11-19 DIAGNOSIS — G62.9 NEUROPATHY: ICD-10-CM

## 2021-11-19 DIAGNOSIS — G62.9 NEUROPATHY: Primary | ICD-10-CM

## 2021-11-19 DIAGNOSIS — G89.29 OTHER CHRONIC PAIN: ICD-10-CM

## 2021-11-19 DIAGNOSIS — I50.9 CONGESTIVE HEART FAILURE, UNSPECIFIED HF CHRONICITY, UNSPECIFIED HEART FAILURE TYPE (HCC): ICD-10-CM

## 2021-11-19 DIAGNOSIS — M54.12 CERVICAL RADICULOPATHY: Primary | ICD-10-CM

## 2021-11-19 DIAGNOSIS — I25.10 CORONARY ARTERY DISEASE INVOLVING NATIVE HEART WITHOUT ANGINA PECTORIS, UNSPECIFIED VESSEL OR LESION TYPE: ICD-10-CM

## 2021-11-19 PROCEDURE — 99306 1ST NF CARE HIGH MDM 50: CPT | Performed by: INTERNAL MEDICINE

## 2021-11-19 RX ORDER — GABAPENTIN 100 MG/1
100 CAPSULE ORAL
Qty: 30 CAPSULE | Refills: 0 | Status: SHIPPED | OUTPATIENT
Start: 2021-11-19 | End: 2022-08-29

## 2021-11-19 NOTE — PROGRESS NOTES
Progress Note     Subjective:  Mr. Phoenix is complaining about neck pain radiating to arm. He is on scheduled oxycodone still not helping him. Physical therapy was stopped. He is also complaining about tingling sensation and neuropathy on the legs. He already switched his Lyrica to gabapentin and he mentioned it is not enough for him. Reports insomnia. He was on trazodone as needed and he thinks he needed regular basis. Depression seems under control. Eating and sleeping well. Objective:    VITALS:  T:  98 degrees Fahrenheit. P:  82 per minute. BP:  145/89 mmHg. SaO2:  97% on room air. Weight is 237 pounds. He has gained weight. HEENT:  No abnormality detected. NECK:  Supple, no JVD, no carotid bruits, no thyromegaly. Spine nontender. Mild paravertebral muscle spasm present. Range of motion okay. CHEST:  Chest is clear to auscultation bilaterally. No rales, no rhonchi. CARDIOVASCULAR:  S1, S2 normal.  Regular rate and rhythm. ABDOMEN:  Soft, nontender, nondistended, bowel sounds present. EXTREMITIES:  No edema is noticed. Dorsalis pedis pulse normal.    NEUROLOGICAL:  Alert and oriented x3. Laboratory Data:   Labs were reviewed. Hemoglobin A1c is 6.9. CBC and CMP stable. Assessment and Plan:   1. Peripheral neuropathy. The patient is on gabapentin 300 mg three times a day. We will add 100 mg gabapentin during night time. 2. Neck pain. Possible cervical radiculopathy. We will start physical therapy with TENS unit stimulation. We will start him on meloxicam 15 mg p.o. daily. 3. Systolic heart failure with reduced ejection fraction. He is on metoprolol and losartan. No leg edema. 4. Coronary artery disease. The patient is on aspirin, statin and beta-blocker. 5. Depression. The patient is on sertraline, doing well. THIS IS NOT A COMPLETE MEDICAL RECORD ON THIS PATIENT.    THIS IS A PATIENT AT [facility].    PLEASE CONTACT THE FACILITY LISTED BELOW FOR MORE INFORMATION ON THIS PATIENT.    THE COMPLETE RECORD RESIDES WITH THIS LONG TERM CARE [facility]

## 2021-12-10 ENCOUNTER — OFFICE VISIT (OUTPATIENT)
Dept: INTERNAL MEDICINE CLINIC | Age: 56
End: 2021-12-10
Payer: MEDICAID

## 2021-12-10 DIAGNOSIS — I25.10 CORONARY ARTERY DISEASE INVOLVING NATIVE HEART WITHOUT ANGINA PECTORIS, UNSPECIFIED VESSEL OR LESION TYPE: ICD-10-CM

## 2021-12-10 DIAGNOSIS — I50.9 CONGESTIVE HEART FAILURE, UNSPECIFIED HF CHRONICITY, UNSPECIFIED HEART FAILURE TYPE (HCC): ICD-10-CM

## 2021-12-10 DIAGNOSIS — G89.29 OTHER CHRONIC PAIN: ICD-10-CM

## 2021-12-10 DIAGNOSIS — I73.9 PAD (PERIPHERAL ARTERY DISEASE) (HCC): ICD-10-CM

## 2021-12-10 DIAGNOSIS — G47.00 INSOMNIA, UNSPECIFIED TYPE: ICD-10-CM

## 2021-12-10 DIAGNOSIS — G62.9 NEUROPATHY: ICD-10-CM

## 2021-12-10 DIAGNOSIS — M54.2 NECK PAIN: Primary | ICD-10-CM

## 2021-12-10 DIAGNOSIS — M54.12 CERVICAL RADICULOPATHY: ICD-10-CM

## 2021-12-10 PROCEDURE — 99308 SBSQ NF CARE LOW MDM 20: CPT | Performed by: NURSE PRACTITIONER

## 2021-12-10 NOTE — PROGRESS NOTES
THIS IS NOT A COMPLETE MEDICAL RECORD ON THIS PATIENT. THIS IS A PATIENT AT [facility]. PLEASE CONTACT THE FACILITY LISTED BELOW FOR MORE INFORMATION ON THIS PATIENT. THE COMPLETE RECORD RESIDES WITH THIS LONG TERM CARE FACILITY. HISTORY OF PRESENT ILLNESS  Ramone Sheldon is a 54 y.o. male. This patient is currently under the care of Dr. Goff at [facility].  The patient was admitted to this facility following hospitalization related to acute respiratory failure, NSTEMI, CHF, and non-sustained SVT. His past medical history includes severe PVD with Buerger's disease, CHF, HTN, neuropathy, chronic pain, depression, tobacco use, alcohol abuse  The patient is seen today per his request. He has complaints of pain to his neck, initially radiating down right arm, now with pain worse on the left side. Patient states he has had chronic neck and back pain for years, with PT effective in the past for pain relief. He has recently completed a 5 day course of PT which did help some. He has also purchased a heating pad for comfort. Patient recently tried a course of Meloxicam, which he states did not seem to help much. He continues on Percocet 5-325 mg po tid and gabapentin 300 mg po tid. He has ongoing lower extremity neuropathy, as well. HPI    Review of Systems   Constitutional: Negative for chills and fever. HENT: Negative for congestion. Respiratory: Negative for cough and shortness of breath. Cardiovascular: Negative for chest pain. Gastrointestinal: Negative. Genitourinary: Negative. Musculoskeletal: Positive for back pain, joint pain, myalgias and neck pain. Neuropathy in feet   Neurological: Positive for tingling. Negative for tremors, focal weakness, seizures and headaches. Endo/Heme/Allergies: Negative. Physical Exam  Constitutional:       General: He is not in acute distress. HENT:      Head: Normocephalic and atraumatic.    Eyes: Conjunctiva/sclera: Conjunctivae normal.   Musculoskeletal:      Right lower leg: No edema. Left lower leg: No edema. Skin:     General: Skin is warm and dry. Neurological:      Mental Status: He is alert. Psychiatric:         Mood and Affect: Mood normal.         ASSESSMENT and PLAN  Encounter Diagnoses   Name Primary?  Neck pain Yes    Cervical radiculopathy     Other chronic pain     Coronary artery disease involving native heart without angina pectoris, unspecified vessel or lesion type     Neuropathy     Insomnia, unspecified type     Congestive heart failure, unspecified HF chronicity, unspecified heart failure type (HCC)     PAD (peripheral artery disease) (HCC)      Will increase, gabapentin 400 mg po tid. Refer to orthopedic spine specialist.    Reviewed medications and side effects in detail. Nursing to continue to monitor closely and notify MD/NP of any change in condition.

## 2022-01-21 ENCOUNTER — OFFICE VISIT (OUTPATIENT)
Dept: INTERNAL MEDICINE CLINIC | Age: 57
End: 2022-01-21

## 2022-01-21 ENCOUNTER — EXTERNAL NURSING HOME DOCUMENTATION (OUTPATIENT)
Dept: INTERNAL MEDICINE CLINIC | Age: 57
End: 2022-01-21
Payer: MEDICAID

## 2022-01-21 DIAGNOSIS — J30.9 ALLERGIC RHINITIS, UNSPECIFIED SEASONALITY, UNSPECIFIED TRIGGER: ICD-10-CM

## 2022-01-21 DIAGNOSIS — I25.10 CORONARY ARTERY DISEASE INVOLVING NATIVE HEART WITHOUT ANGINA PECTORIS, UNSPECIFIED VESSEL OR LESION TYPE: ICD-10-CM

## 2022-01-21 DIAGNOSIS — I50.9 CONGESTIVE HEART FAILURE, UNSPECIFIED HF CHRONICITY, UNSPECIFIED HEART FAILURE TYPE (HCC): ICD-10-CM

## 2022-01-21 DIAGNOSIS — J34.89 SINUS PRESSURE: Primary | ICD-10-CM

## 2022-01-21 DIAGNOSIS — R63.5 WEIGHT GAIN: ICD-10-CM

## 2022-01-21 DIAGNOSIS — M54.12 CERVICAL RADICULOPATHY: Primary | ICD-10-CM

## 2022-01-21 DIAGNOSIS — E11.9 TYPE 2 DIABETES MELLITUS WITHOUT COMPLICATION, WITHOUT LONG-TERM CURRENT USE OF INSULIN (HCC): ICD-10-CM

## 2022-01-21 PROCEDURE — 99309 SBSQ NF CARE MODERATE MDM 30: CPT | Performed by: INTERNAL MEDICINE

## 2022-01-21 NOTE — PROGRESS NOTES
Progress Note     Subjective:  Mr. Gera Reina is doing well in the nursing home. His neck pain has improved. Since the patient's gabapentin dosage was increased he is little fatigued from it. He is also taking oxycodone. Overall he is doing well. He reports pain on the left sided shoulder joint ongoing this weekend. We gave him oral diclofenac that he used in the past.  Otherwise he is sleeping and eating well. Objective:    VITALS:  T:  97.6 degrees Fahrenheit. P:  67 per minute. BP:  142/87 mmHg. SaO2:  98% on room air. Weight is 261 pounds. HEENT:  No abnormality detected. NECK:  Supple, no JVD, no carotid bruits, no thyromegaly. CHEST:  Chest is clear to auscultation bilaterally. No rales, no rhonchi. CARDIOVASCULAR:  S1, S2 normal.  Regular rate and rhythm. ABDOMEN:  Soft, nontender, nondistended, bowel sounds present. EXTREMITIES:  No edema is noted. Dorsalis pedis pulse normal.  Left shoulder joint, mild tenderness present, range of motion mildly restricted. NEUROLOGICAL:  Alert and oriented x3. No neurological deficit. Laboratory Data:   Labs were reviewed. Last labs showed hemoglobin A1c was 6.9.  CBC, CMP stable. Assessment and Plan:   1. Left shoulder pain. His kidney function, creatinine was okay. We will give him diclofenac 75 mg once a day for seven days. 2. Type 2 diabetes mellitus. Last A1c 6.9. We will start him on metformin 750 mg every night. We will monitor blood sugar every day. He should be on 1800 calorie ADA diet. 3. Systolic heart failure with reduced ejection fraction. He is compensated, but he has gained weight. We will continue losartan, metoprolol and spironolactone. 4. Severe PVD with Buerger's disease. The patient is on aspirin, statin and Percocet, doing well. 5. Depression. He is on sertraline. He is stable. THIS IS NOT A COMPLETE MEDICAL RECORD ON THIS PATIENT.    THIS IS A PATIENT AT [facility].    PLEASE CONTACT THE FACILITY LISTED BELOW FOR MORE INFORMATION ON THIS PATIENT.    THE COMPLETE RECORD RESIDES WITH THIS LONG TERM CARE FACILITY

## 2022-01-21 NOTE — PROGRESS NOTES
THIS IS NOT A COMPLETE MEDICAL RECORD ON THIS PATIENT. THIS IS A PATIENT AT [facility]. PLEASE CONTACT THE FACILITY LISTED BELOW FOR MORE INFORMATION ON THIS PATIENT. THE COMPLETE RECORD RESIDES WITH THIS LONG TERM CARE FACILITY. HISTORY OF PRESENT ILLNESS  Cedrick Delgado is a 64 y.o. male.  This patient is currently under the care of Dr. Salvatore Olson at [facility].  The patient was admitted to this facility following hospitalization related to acute respiratory failure, NSTEMI, CHF, and non-sustained SVT. His past medical history includes severe PVD with Buerger's disease, CHF, HTN, neuropathy, chronic pain, depression, tobacco use, alcohol abuse  The patient is seen today per his request.  Patient has complaints of sinus pressure (L>R) and requests sinus medication. HPI    Review of Systems   Constitutional: Negative for chills and fever. HENT: Positive for sinus pain. Dental pain   Respiratory: Negative for cough and shortness of breath. Cardiovascular: Negative for chest pain. Neurological: Positive for headaches. Physical Exam  Constitutional:       General: He is not in acute distress. Comments: ambulatory   HENT:      Head: Normocephalic and atraumatic. Eyes:      Conjunctiva/sclera: Conjunctivae normal.   Abdominal:      General: There is no distension. Musculoskeletal:      Right lower leg: No edema. Left lower leg: No edema. Skin:     General: Skin is warm and dry. Neurological:      Mental Status: He is alert. Psychiatric:         Mood and Affect: Mood normal.         ASSESSMENT and PLAN  Encounter Diagnoses   Name Primary?  Sinus pressure Yes    Allergic rhinitis, unspecified seasonality, unspecified trigger      Covid 19 screening per facility protocol. Will order  Cetirizine 10 mg po daily x 14 days. Flonase 2 sprays to each nostril daily x 14 days. Reviewed medications and side effects in detail.      Nursing to continue to monitor closely and notify MD/NP of any change in condition.

## 2022-01-24 ENCOUNTER — OFFICE VISIT (OUTPATIENT)
Dept: INTERNAL MEDICINE CLINIC | Age: 57
End: 2022-01-24
Payer: MEDICAID

## 2022-01-24 DIAGNOSIS — E11.9 TYPE 2 DIABETES MELLITUS WITHOUT COMPLICATION, WITHOUT LONG-TERM CURRENT USE OF INSULIN (HCC): ICD-10-CM

## 2022-01-24 DIAGNOSIS — G89.29 OTHER CHRONIC PAIN: ICD-10-CM

## 2022-01-24 DIAGNOSIS — G62.9 NEUROPATHY: ICD-10-CM

## 2022-01-24 DIAGNOSIS — I25.10 CORONARY ARTERY DISEASE INVOLVING NATIVE HEART WITHOUT ANGINA PECTORIS, UNSPECIFIED VESSEL OR LESION TYPE: Primary | ICD-10-CM

## 2022-01-24 DIAGNOSIS — I50.9 CONGESTIVE HEART FAILURE, UNSPECIFIED HF CHRONICITY, UNSPECIFIED HEART FAILURE TYPE (HCC): ICD-10-CM

## 2022-01-24 PROCEDURE — 99309 SBSQ NF CARE MODERATE MDM 30: CPT | Performed by: NURSE PRACTITIONER

## 2022-01-24 NOTE — PROGRESS NOTES
THIS IS NOT A COMPLETE MEDICAL RECORD ON THIS PATIENT. THIS IS A PATIENT AT [facility]. PLEASE CONTACT THE FACILITY LISTED BELOW FOR MORE INFORMATION ON THIS PATIENT. THE COMPLETE RECORD RESIDES WITH THIS LONG TERM CARE FACILITY. HISTORY OF PRESENT ILLNESS  Krystian Fan is a 64 y.o. male. This patient is currently under the care of Dr. Sigrid Palmer at [facility].  The patient was admitted to this facility following hospitalization related to acute respiratory failure, NSTEMI, CHF, and non-sustained SVT. His past medical history includes severe PVD with Buerger's disease, CHF, HTN, neuropathy, chronic pain, depression, tobacco use, alcohol abuse. Patient is seen today per his request.  He has complaints of foot pain, particularly pain to left 3rd toe. No know injury recalled at this time. Pain is described as aching, with history of neuropathy to bilateral feet, as well. Patient had recent follow-up with cardiology last week and was prescribed Entresto. Pharmacy notes that patient has lisinopril listed as an allergy and has not yet sent medication to facility. Patient states he experienced side effect of cough with lisinopril, no true allergy symptoms. HPI    Review of Systems   Constitutional: Negative for chills and fever. HENT: Negative for congestion. Respiratory: Negative for cough and shortness of breath. Cardiovascular: Negative for chest pain. Gastrointestinal: Negative. Genitourinary: Negative. Musculoskeletal: Positive for back pain, joint pain, myalgias and neck pain. Neuropathy in feet   Neurological: Positive for tingling. Negative for tremors, focal weakness, seizures and headaches. Endo/Heme/Allergies: Negative. Physical Exam  Constitutional:       General: He is not in acute distress. HENT:      Head: Normocephalic and atraumatic.    Eyes:      Conjunctiva/sclera: Conjunctivae normal.   Cardiovascular:      Pulses:           Dorsalis pedis pulses are 2+ on the right side and 2+ on the left side. Musculoskeletal:      Right lower leg: No edema. Left lower leg: No edema. Skin:     General: Skin is warm and dry. Capillary Refill: Capillary refill takes less than 2 seconds. Comments: No skin change/ injury noted to left third toe   Neurological:      Mental Status: He is alert. Psychiatric:         Mood and Affect: Mood normal.         ASSESSMENT and PLAN  Encounter Diagnoses   Name Primary?  Coronary artery disease involving native heart without angina pectoris, unspecified vessel or lesion type Yes    Congestive heart failure, unspecified HF chronicity, unspecified heart failure type ([facility] [address]     Type 2 diabetes mellitus without complication, without long-term current use of insulin (HCC)     Neuropathy     Other chronic pain      Continue to follow with cardiology. Pharmacy may dispense Entresto; nursing to monitor for cough. Notify of worsening foot pain. Reviewed medications and side effects in detail. Nursing to continue to monitor closely and notify MD/NP of any change in condition.

## 2022-03-09 LAB — LEFT VENTRICULAR EJECTION FRACTION, EXTERNAL: 25

## 2022-03-15 ENCOUNTER — EXTERNAL NURSING HOME DOCUMENTATION (OUTPATIENT)
Dept: INTERNAL MEDICINE CLINIC | Age: 57
End: 2022-03-15
Payer: MEDICAID

## 2022-03-15 DIAGNOSIS — M25.512 CHRONIC LEFT SHOULDER PAIN: Primary | ICD-10-CM

## 2022-03-15 DIAGNOSIS — I50.9 CONGESTIVE HEART FAILURE, UNSPECIFIED HF CHRONICITY, UNSPECIFIED HEART FAILURE TYPE (HCC): ICD-10-CM

## 2022-03-15 DIAGNOSIS — I25.10 CORONARY ARTERY DISEASE INVOLVING NATIVE HEART WITHOUT ANGINA PECTORIS, UNSPECIFIED VESSEL OR LESION TYPE: ICD-10-CM

## 2022-03-15 DIAGNOSIS — G89.29 CHRONIC LEFT SHOULDER PAIN: Primary | ICD-10-CM

## 2022-03-15 DIAGNOSIS — E78.2 MIXED HYPERLIPIDEMIA: ICD-10-CM

## 2022-03-15 DIAGNOSIS — E11.9 TYPE 2 DIABETES MELLITUS WITHOUT COMPLICATION, WITHOUT LONG-TERM CURRENT USE OF INSULIN (HCC): ICD-10-CM

## 2022-03-15 PROCEDURE — 99309 SBSQ NF CARE MODERATE MDM 30: CPT | Performed by: INTERNAL MEDICINE

## 2022-03-15 NOTE — Clinical Note
Progress Note     Subjective:  Krystian Fan is doing well in the nursing home. He is still complaining about left shoulder pain, which is bothering him a lot. He was taking ibuprofen 800 mg at home, but here he is taking diclofenac. He had history of heart failure with low ejection fraction. I am not comfortable with continuing him with diclofenac. Anyway, otherwise he is doing well. Blood sugar seems under control. He has congestive heart failure with reduced ejection fraction. Seen cardiologist, on multiple medications. Objective:    VITALS:  T:  98.2 degrees Fahrenheit. P:  85 per minute. BP:  142/79 mmHg. SaO2:  97% on room air. Weight is 269 pounds. He has gained more weight. He was 250 pounds before. HEENT:  No abnormality detected. NECK:  Supple, no JVD, no carotid bruits, no thyromegaly. CHEST:  Chest is clear to auscultation bilaterally. No rales, no rhonchi. CARDIOVASCULAR:  S1, S2 normal.  Regular rate and rhythm. ABDOMEN:  Soft, nontender. Bowel sounds present. The patient's abdomen looks distended  EXTREMITIES:  No edema is noted. Dorsalis pedis pulse normal.    NEUROLOGICAL:  Alert and oriented x3. MUSCULOSKELETAL:  Left shoulder, point tenderness present. Range of motion restricted. Laboratory Data:   Last labs done in September hemoglobin A1c 6.9. CBC and CMP stable. Assessment and Plan:   1. Type 2 diabetes mellitus. The patient on metformin. He will maintain on low-calorie diet. We will repeat hemoglobin A1c and CMP. 2. Chronic systolic heart failure with low ejection fraction. Seeing cardiologist regularly. The patient is on Bumex and spironolactone. Also on losartan and metoprolol. We will repeat CMP and cardiac BNP. 3. Depression on sertraline doing well. 4. Severe PVD with Buerger's disease. The patient is on aspirin and statin doing well  5. Chronic shoulder pain, might have rotator cuff injury.   We will refer the patient for physical therapy again and we will refer him to Orthopedics for shoulder injection. THIS IS NOT A COMPLETE MEDICAL RECORD ON THIS PATIENT.    THIS IS A PATIENT AT [facility].    PLEASE CONTACT THE FACILITY LISTED BELOW FOR MORE INFORMATION ON THIS PATIENT.    THE COMPLETE RECORD RESIDES WITH THIS LONG TERM CARE FACILITY

## 2022-03-16 NOTE — PROGRESS NOTES
Progress Note     Subjective:  Isacc Ivy is doing well in the nursing home. He is still complaining about left shoulder pain, which is bothering him a lot. He was taking ibuprofen 800 mg at home, but here he is taking diclofenac. He had history of heart failure with low ejection fraction. I am not comfortable with continuing him with diclofenac. Anyway, otherwise he is doing well. Blood sugar seems under control. He has congestive heart failure with reduced ejection fraction. Seen cardiologist, on multiple medications. Objective:    VITALS:  T:  98.2 degrees Fahrenheit. P:  85 per minute. BP:  142/79 mmHg. SaO2:  97% on room air. Weight is 269 pounds. He has gained more weight. He was 250 pounds before. HEENT:  No abnormality detected. NECK:  Supple, no JVD, no carotid bruits, no thyromegaly. CHEST:  Chest is clear to auscultation bilaterally. No rales, no rhonchi. CARDIOVASCULAR:  S1, S2 normal.  Regular rate and rhythm. ABDOMEN:  Soft, nontender. Bowel sounds present. The patient's abdomen looks distended  EXTREMITIES:  No edema is noted. Dorsalis pedis pulse normal.    NEUROLOGICAL:  Alert and oriented x3. MUSCULOSKELETAL:  Left shoulder, point tenderness present. Range of motion restricted. Laboratory Data:   Last labs done in September hemoglobin A1c 6.9. CBC and CMP stable. Assessment and Plan:   1. Type 2 diabetes mellitus. The patient on metformin. He will maintain on low-calorie diet. We will repeat hemoglobin A1c and CMP. 2. Chronic systolic heart failure with low ejection fraction. Seeing cardiologist regularly. The patient is on Bumex and spironolactone. Also on losartan and metoprolol. We will repeat CMP and cardiac BNP. 3. Depression on sertraline doing well. 4. Severe PVD with Buerger's disease. The patient is on aspirin and statin doing well  5. Chronic shoulder pain, might have rotator cuff injury.   We will refer the patient for physical therapy again and we will refer him to Orthopedics for shoulder injection.

## 2022-03-18 ENCOUNTER — OFFICE VISIT (OUTPATIENT)
Dept: INTERNAL MEDICINE CLINIC | Age: 57
End: 2022-03-18
Payer: MEDICAID

## 2022-03-18 DIAGNOSIS — R79.9 ELEVATED BUN: Primary | ICD-10-CM

## 2022-03-18 DIAGNOSIS — E11.9 TYPE 2 DIABETES MELLITUS WITHOUT COMPLICATION, WITHOUT LONG-TERM CURRENT USE OF INSULIN (HCC): ICD-10-CM

## 2022-03-18 DIAGNOSIS — I50.9 CONGESTIVE HEART FAILURE, UNSPECIFIED HF CHRONICITY, UNSPECIFIED HEART FAILURE TYPE (HCC): ICD-10-CM

## 2022-03-18 DIAGNOSIS — R79.89 ELEVATED SERUM CREATININE: ICD-10-CM

## 2022-03-18 PROCEDURE — 99308 SBSQ NF CARE LOW MDM 20: CPT | Performed by: NURSE PRACTITIONER

## 2022-03-18 NOTE — PROGRESS NOTES
THIS IS NOT A COMPLETE MEDICAL RECORD ON THIS PATIENT. THIS IS A PATIENT AT [facility]. PLEASE CONTACT THE FACILITY LISTED BELOW FOR MORE INFORMATION ON THIS PATIENT. THE COMPLETE RECORD RESIDES WITH THIS LONG TERM CARE FACILITY. HISTORY OF PRESENT ILLNESS  Luis F Siddiqui is a 64 y.o. male. This patient is currently under the care of Dr. Clifford Holder at [facility].  The patient was admitted to this facility following hospitalization related to acute respiratory failure, NSTEMI, CHF, and non-sustained SVT. His past medical history includes severe PVD with Buerger's disease, CHF, HTN, neuropathy, chronic pain, depression, tobacco use, alcohol abuse. Patient is seen today per nursing request for lab review. Recent labs significant for BUN-38, creatinine- 2.25. HPI    Review of Systems   Constitutional: Negative for chills and fever. HENT: Negative for congestion. Dental pain   Respiratory: Negative for cough and shortness of breath. Cardiovascular: Negative for chest pain. Gastrointestinal: Negative. Genitourinary: Negative. Musculoskeletal: Positive for back pain, joint pain, myalgias and neck pain. Neuropathy in feet   Neurological: Positive for tingling. Negative for tremors, focal weakness, seizures and headaches. Endo/Heme/Allergies: Negative. Physical Exam  Constitutional:       General: He is not in acute distress. Comments: ambulatory   HENT:      Head: Normocephalic and atraumatic. Eyes:      Conjunctiva/sclera: Conjunctivae normal.   Abdominal:      General: There is no distension. Musculoskeletal:      Right lower leg: No edema. Left lower leg: No edema. Skin:     General: Skin is warm and dry. Neurological:      Mental Status: He is alert. Psychiatric:         Mood and Affect: Mood normal.         ASSESSMENT and PLAN  Encounter Diagnoses   Name Primary?     Elevated BUN Yes    Elevated serum creatinine     Congestive heart failure, unspecified HF chronicity, unspecified heart failure type ([facility] [address]     Type 2 diabetes mellitus without complication, without long-term current use of insulin (HCC)      D/C Diclofenac  Reduce, Metformin  mg po daily. Repeat BMP 1 week. Upcoming dental appointment, April 1, 2022, per SNF staff. Notify of worsening dental pain or s/sx of infection. Reviewed medications and side effects in detail. Nursing to continue to monitor closely and notify MD/NP of any change in condition.

## 2022-03-28 ENCOUNTER — OFFICE VISIT (OUTPATIENT)
Dept: INTERNAL MEDICINE CLINIC | Age: 57
End: 2022-03-28
Payer: MEDICAID

## 2022-03-28 DIAGNOSIS — I25.10 CORONARY ARTERY DISEASE INVOLVING NATIVE HEART WITHOUT ANGINA PECTORIS, UNSPECIFIED VESSEL OR LESION TYPE: ICD-10-CM

## 2022-03-28 DIAGNOSIS — E11.9 TYPE 2 DIABETES MELLITUS WITHOUT COMPLICATION, WITHOUT LONG-TERM CURRENT USE OF INSULIN (HCC): ICD-10-CM

## 2022-03-28 DIAGNOSIS — I50.9 CONGESTIVE HEART FAILURE, UNSPECIFIED HF CHRONICITY, UNSPECIFIED HEART FAILURE TYPE (HCC): ICD-10-CM

## 2022-03-28 DIAGNOSIS — K04.7 DENTAL INFECTION: Primary | ICD-10-CM

## 2022-03-28 PROCEDURE — 99308 SBSQ NF CARE LOW MDM 20: CPT | Performed by: NURSE PRACTITIONER

## 2022-03-28 NOTE — PROGRESS NOTES
THIS IS NOT A COMPLETE MEDICAL RECORD ON THIS PATIENT. THIS IS A PATIENT AT [facility]. PLEASE CONTACT THE FACILITY LISTED BELOW FOR MORE INFORMATION ON THIS PATIENT. THE COMPLETE RECORD RESIDES WITH THIS LONG TERM CARE FACILITY. HISTORY OF PRESENT ILLNESS  Luz Maria Miramontes is a 64 y.o. male. This patient is currently under the care of Dr. Elaina Farley at [facility].  The patient was admitted to this facility following hospitalization related to acute respiratory failure, NSTEMI, CHF, and non-sustained SVT. His past medical history includes severe PVD with Buerger's disease, CHF, HTN, neuropathy, chronic pain, depression, tobacco use, alcohol abuse. Patient is seen today per nursing request.  Patient has complaints of tooth to the left side of his mouth. He also describes pain to left jaw and left maxillary sinus area. He is concerned for dental infection. Patient states he has upcoming dental appointment this Friday. Patient also notes upcoming appt with cardiology to discuss possible pacemaker placement. BMP collected 3/21/22 reviewed:  BUN- 26, Creatinine-1.23,, Na-140, K-4.9; Improved from previous drawn on 03/16/22 with BUN-38,  Creatinine-2.25    HPI    Review of Systems   Constitutional: Negative for chills and fever. HENT: Negative for congestion. Dental pain   Respiratory: Negative for cough and shortness of breath. Cardiovascular: Negative for chest pain. Gastrointestinal: Negative. Genitourinary: Negative. Musculoskeletal: Positive for back pain, joint pain, myalgias and neck pain. Neuropathy in feet   Neurological: Positive for tingling. Negative for tremors, focal weakness, seizures and headaches. Endo/Heme/Allergies: Negative. Physical Exam  Constitutional:       General: He is not in acute distress. HENT:      Head: Normocephalic and atraumatic.       Mouth/Throat:      Mouth: Mucous membranes are moist.      Pharynx: No oropharyngeal exudate or posterior oropharyngeal erythema. Eyes:      Conjunctiva/sclera: Conjunctivae normal.   Musculoskeletal:      Right lower leg: No edema. Left lower leg: No edema. Skin:     General: Skin is warm and dry. Neurological:      Mental Status: He is alert. Psychiatric:         Mood and Affect: Mood normal.         ASSESSMENT and PLAN  Encounter Diagnoses   Name Primary?  Dental infection Yes    Congestive heart failure, unspecified HF chronicity, unspecified heart failure type ([facility] [address]     Coronary artery disease involving native heart without angina pectoris, unspecified vessel or lesion type     Type 2 diabetes mellitus without complication, without long-term current use of insulin ([facility])      Will order  Amoxicillin 500 mg po tid x 7 days. Follow-up with dentistry as scheduled. Reviewed medications and side effects in detail. Continue current medications at this time. Nursing to continue to monitor closely and notify MD/NP of any change in condition.

## 2022-04-08 ENCOUNTER — OFFICE VISIT (OUTPATIENT)
Dept: INTERNAL MEDICINE CLINIC | Age: 57
End: 2022-04-08
Payer: MEDICAID

## 2022-04-08 DIAGNOSIS — M54.9 CHRONIC BACK PAIN, UNSPECIFIED BACK LOCATION, UNSPECIFIED BACK PAIN LATERALITY: ICD-10-CM

## 2022-04-08 DIAGNOSIS — I25.10 CORONARY ARTERY DISEASE INVOLVING NATIVE HEART WITHOUT ANGINA PECTORIS, UNSPECIFIED VESSEL OR LESION TYPE: ICD-10-CM

## 2022-04-08 DIAGNOSIS — G62.9 NEUROPATHY: ICD-10-CM

## 2022-04-08 DIAGNOSIS — I50.9 CONGESTIVE HEART FAILURE, UNSPECIFIED HF CHRONICITY, UNSPECIFIED HEART FAILURE TYPE (HCC): ICD-10-CM

## 2022-04-08 DIAGNOSIS — G89.29 CHRONIC BACK PAIN, UNSPECIFIED BACK LOCATION, UNSPECIFIED BACK PAIN LATERALITY: ICD-10-CM

## 2022-04-08 DIAGNOSIS — G89.29 CHRONIC LEFT SHOULDER PAIN: ICD-10-CM

## 2022-04-08 DIAGNOSIS — K08.89 PAIN, DENTAL: Primary | ICD-10-CM

## 2022-04-08 DIAGNOSIS — E11.9 TYPE 2 DIABETES MELLITUS WITHOUT COMPLICATION, WITHOUT LONG-TERM CURRENT USE OF INSULIN (HCC): ICD-10-CM

## 2022-04-08 DIAGNOSIS — M25.512 CHRONIC LEFT SHOULDER PAIN: ICD-10-CM

## 2022-04-08 PROCEDURE — 99308 SBSQ NF CARE LOW MDM 20: CPT | Performed by: NURSE PRACTITIONER

## 2022-04-08 NOTE — PROGRESS NOTES
THIS IS NOT A COMPLETE MEDICAL RECORD ON THIS PATIENT. THIS IS A PATIENT AT [facility]. PLEASE CONTACT THE FACILITY LISTED BELOW FOR MORE INFORMATION ON THIS PATIENT. THE COMPLETE RECORD RESIDES WITH THIS LONG TERM CARE FACILITY. HISTORY OF PRESENT ILLNESS  Abby Harrison is a 64 y.o. male. This patient is currently under the care of Dr. Anjali Castillo at [facility].  The patient was admitted to this facility following hospitalization related to acute respiratory failure, NSTEMI, CHF, and non-sustained SVT. His past medical history includes severe PVD with Buerger's disease, CHF, HTN, neuropathy, chronic pain, depression, tobacco use, alcohol abuse. Patient is seen today per his request.  He has complaints of ongoing pain of tooth to the left side of his mouth. He also describes pain to left jaw and left maxillary sinus area. Patient describes pain as throbbing and states he cannot find any relief. He has had recent evaluation be dentistry. Per chart review, order was received for Tylenol #3, but nursing and patient note this has not arrived from pharmacy, due to need for hard script. Patient states he is scheduled for tooth extraction on April 22, 2022. He requests medication to further assist with pain until procedure. Patient has previously completed a course of amoxicillin. Patient also notes ongoing chronic back pain, as well as pain to his shoulders. He says he does have upcoming orthopedics appointment on 04/14/22. Patient is currently taking Percocet 5-325 mg po tid for pain. He says he feels mild relief from this regimen. HPI    Review of Systems   Constitutional: Negative for chills and fever. HENT: Negative for congestion. Dental pain   Respiratory: Negative for cough and shortness of breath. Cardiovascular: Negative for chest pain. Gastrointestinal: Negative. Genitourinary: Negative.     Musculoskeletal: Positive for back pain, joint pain, myalgias and neck pain.        Neuropathy in feet   Neurological: Positive for tingling. Negative for tremors, focal weakness, seizures and headaches. Endo/Heme/Allergies: Negative. Physical Exam  Constitutional:       General: He is not in acute distress. HENT:      Head: Normocephalic and atraumatic. Mouth/Throat:      Mouth: Mucous membranes are moist.   Eyes:      Conjunctiva/sclera: Conjunctivae normal.   Musculoskeletal:      Right lower leg: No edema. Left lower leg: No edema. Skin:     General: Skin is warm and dry. Neurological:      Mental Status: He is alert. Psychiatric:         Mood and Affect: Mood normal.         ASSESSMENT and PLAN  Encounter Diagnoses   Name Primary?  Pain, dental Yes    Chronic left shoulder pain     Chronic back pain, unspecified back location, unspecified back pain laterality     Neuropathy     Congestive heart failure, unspecified HF chronicity, unspecified heart failure type ([facility] [address]     Coronary artery disease involving native heart without angina pectoris, unspecified vessel or lesion type     Type 2 diabetes mellitus without complication, without long-term current use of insulin ([facility])      D/C Tylenol #3. Increase, Percocet 7.5-325 mg po tid, #45, 0 refills    Follow-up with dentistry. Upcoming orthopedics appointment. Advised patient to use ibuprofen only sparingly. Reviewed medications and side effects in detail. Nursing to continue to monitor closely and notify MD/NP of any change in condition.

## 2022-04-11 DIAGNOSIS — M25.512 CHRONIC LEFT SHOULDER PAIN: Primary | ICD-10-CM

## 2022-04-11 DIAGNOSIS — G89.29 CHRONIC BACK PAIN, UNSPECIFIED BACK LOCATION, UNSPECIFIED BACK PAIN LATERALITY: ICD-10-CM

## 2022-04-11 DIAGNOSIS — M54.9 CHRONIC BACK PAIN, UNSPECIFIED BACK LOCATION, UNSPECIFIED BACK PAIN LATERALITY: ICD-10-CM

## 2022-04-11 DIAGNOSIS — K08.89 PAIN, DENTAL: ICD-10-CM

## 2022-04-11 DIAGNOSIS — G62.9 NEUROPATHY: ICD-10-CM

## 2022-04-11 DIAGNOSIS — G89.29 CHRONIC LEFT SHOULDER PAIN: Primary | ICD-10-CM

## 2022-04-11 RX ORDER — OXYCODONE AND ACETAMINOPHEN 7.5; 325 MG/1; MG/1
1 TABLET ORAL 3 TIMES DAILY
Qty: 45 TABLET | Refills: 0 | Status: SHIPPED | OUTPATIENT
Start: 2022-04-11 | End: 2022-04-26

## 2022-04-14 ENCOUNTER — OFFICE VISIT (OUTPATIENT)
Dept: ORTHOPEDIC SURGERY | Age: 57
End: 2022-04-14
Payer: MEDICAID

## 2022-04-14 VITALS — WEIGHT: 273 LBS | HEIGHT: 76 IN | BODY MASS INDEX: 33.24 KG/M2

## 2022-04-14 DIAGNOSIS — M25.512 ACUTE PAIN OF LEFT SHOULDER: Primary | ICD-10-CM

## 2022-04-14 DIAGNOSIS — M75.42 IMPINGEMENT SYNDROME OF LEFT SHOULDER: ICD-10-CM

## 2022-04-14 DIAGNOSIS — M25.511 ACUTE PAIN OF RIGHT SHOULDER: ICD-10-CM

## 2022-04-14 DIAGNOSIS — M75.41 IMPINGEMENT SYNDROME OF RIGHT SHOULDER: ICD-10-CM

## 2022-04-14 PROCEDURE — 99203 OFFICE O/P NEW LOW 30 MIN: CPT | Performed by: ORTHOPAEDIC SURGERY

## 2022-04-14 NOTE — PATIENT INSTRUCTIONS
Date of appointment:  4/14/2022     Examining Physician: Armani Valles          Name:  Drea Goyal                                          YOB: 1965                               Medical record number: [id number]      Diagnosis: Bilateral shoulder tendinitis and impingement    Recommendation: I recommend 6 weeks of formal physical therapy and Occupational Therapy for the bilateral shoulders. Also recommend a 1 week course of a Medrol Dosepak. Can follow this with topical or oral anti-inflammatory as appropriate.   Follow-up in 6 to 8 weeks as needed    Signed: Jozef Devine DO

## 2022-04-14 NOTE — PROGRESS NOTES
Franky Dance (: 1965) is a 64 y.o. male, established patient, here for evaluation of the following chief complaint(s):  Shoulder Pain (bilateral shoulders)       ASSESSMENT/PLAN:  Below is the assessment and plan developed based on review of pertinent history, physical exam, labs, studies, and medications. Findings were discussed with the patient today. We discussed regimen of ice, anti-inflammatories, physical therapy, home exercise program, and activity modifications. If there is continued pain and symptoms then we will plan for follow-up in the next 4-6 weeks for further evaluation and treatment planning. Plan was relayed to his nursing facility. He was recommended to take Medrol Dosepak and start physical therapy. I will see him back as needed   1. Acute pain of left shoulder  -     XR SHOULDER LT AP/LAT MIN 2 V; Future  2. Acute pain of right shoulder  -     XR SHOULDER RT AP/LAT MIN 2 V; Future  3. Impingement syndrome of left shoulder  4. Impingement syndrome of right shoulder      Return in about 6 weeks (around 2022), or if symptoms worsen or fail to improve. SUBJECTIVE/OBJECTIVE:  Franky Dance (: 1965) is a 64 y.o. male. He describes recent onset of bilateral shoulder pain and stiffness ongoing since the klein with an illness. He notes that he has been less active than normal and this likely caused some of the stiffness and pain. Not on File    Current Outpatient Medications   Medication Sig    oxyCODONE-acetaminophen (PERCOCET 7.5) 7.5-325 mg per tablet Take 1 Tablet by mouth three (3) times daily for 15 days. Max Daily Amount: 3 Tablets.  gabapentin (NEURONTIN) 100 mg capsule Take 1 Capsule by mouth nightly. Max Daily Amount: 100 mg.  aspirin 81 mg chewable tablet Take 1 Tablet by mouth daily.  bumetanide (BUMEX) 1 mg tablet Take 1 Tablet by mouth daily.  gabapentin (NEURONTIN) 300 mg capsule Take 1 Capsule by mouth three (3) times daily.  Max Daily Amount: 900 mg.    losartan (COZAAR) 25 mg tablet Take 0.5 Tablets by mouth daily.  lovastatin (MEVACOR) 20 mg tablet Take 1 Tablet by mouth nightly.  metoprolol succinate (TOPROL-XL) 25 mg XL tablet Take 1 Tablet by mouth daily.  sertraline (ZOLOFT) 25 mg tablet Take 1 Tablet by mouth daily.  spironolactone (ALDACTONE) 25 mg tablet Take 1 Tablet by mouth daily. No current facility-administered medications for this visit. Social History     Socioeconomic History    Marital status:      Spouse name: Not on file    Number of children: Not on file    Years of education: Not on file    Highest education level: Not on file   Occupational History    Not on file   Tobacco Use    Smoking status: Current Every Day Smoker    Smokeless tobacco: Never Used   Vaping Use    Vaping Use: Never used   Substance and Sexual Activity    Alcohol use: Not Currently    Drug use: Never    Sexual activity: Not on file   Other Topics Concern    Not on file   Social History Narrative    Not on file     Social Determinants of Health     Financial Resource Strain:     Difficulty of Paying Living Expenses: Not on file   Food Insecurity:     Worried About [address] [employer] in the Last Year: Not on file    [place]ike of Food in the Last Year: Not on file   Transportation Needs:     Lack of Transportation (Medical): Not on file    Lack of Transportation (Non-Medical):  Not on file   Physical Activity:     Days of Exercise per Week: Not on file    Minutes of Exercise per Session: Not on file   Stress:     Feeling of Stress : Not on file   Social Connections:     Frequency of Communication with Friends and Family: Not on file    Frequency of Social Gatherings with Friends and Family: Not on file    Attends Orthodoxy Services: Not on file    Active Member of Clubs or Organizations: Not on file    Attends Club or Organization Meetings: Not on file    Marital Status: Not on file   Intimate Partner Violence:     Fear of Current or Ex-Partner: Not on file    Emotionally Abused: Not on file    Physically Abused: Not on file    Sexually Abused: Not on file   Housing Stability:     Unable to Pay for Housing in the Last Year: Not on file    Number of Places Lived in the Last Year: Not on file    Unstable Housing in the Last Year: Not on file       History reviewed. No pertinent surgical history. History reviewed. No pertinent family history. REVIEW OF SYSTEMS:    Patient denies any recent fever, chills, nausea, vomiting, chest pain, or shortness of breath. Vitals:  Ht 6' 4\" (1.93 m)   Wt 273 lb (123.8 kg)   BMI 33.23 kg/m²    Body mass index is 33.23 kg/m². PHYSICAL EXAM:  General exam: Patient is awake, alert, and oriented x3. Well-appearing. No acute distress. Ambulates with a normal gait. Left shoulder: Neurovascular and sensory intact. There is tenderness to palpation at the anterior lateral shoulder. There is limited passive and active overhead range of motion. Pain is noted with impingement testing including Diaz exam.  Pain and weakness 4/5 is noted with rotator cuff strength testing including resisted abduction and resisted external rotation. Normal stability. There is some tenderness palpation at the [facility] joint and pain with crossarm exam.    Right shoulder: Neurovascular and sensory intact. There is tenderness to palpation at the anterior lateral shoulder. Slight limitation in passive range of motion on exam.  There is pain with active overhead range of motion. Pain is noted with impingement testing including Diaz exam.  Pain is also noted with rotator cuff strength testing including resisted abduction and resisted external rotation. Normal stability.   There is some tenderness palpation at the [facility] joint and pain with crossarm exam.        IMAGING:    XR Results (most recent):  Results from Appointment encounter on 04/14/22    XR SHOULDER RT AP/LAT MIN 2 V    Narrative  X-rays of the bilateral shoulders 3 views each done today show evidence of maintained glenohumeral joint space. No signs of obvious acute fracture. Type II acromion      XR SHOULDER LT AP/LAT MIN 2 V    Narrative  X-rays of the bilateral shoulders 3 views each done today show evidence of maintained glenohumeral joint space. No signs of obvious acute fracture. Type II acromion         Orders Placed This Encounter    XR SHOULDER LT AP/LAT MIN 2 V     Standing Status:   Future     Number of Occurrences:   1     Standing Expiration Date:   5/14/2022     Order Specific Question:   Reason for Exam     Answer:   shoulder pain    XR SHOULDER RT AP/LAT MIN 2 V     Standing Status:   Future     Number of Occurrences:   1     Standing Expiration Date:   5/14/2022     Order Specific Question:   Reason for Exam     Answer:   Shoulder pain              An electronic signature was used to authenticate this note.   -- Collette Estevez, DO

## 2022-04-15 ENCOUNTER — OFFICE VISIT (OUTPATIENT)
Dept: INTERNAL MEDICINE CLINIC | Age: 57
End: 2022-04-15
Payer: MEDICAID

## 2022-04-15 DIAGNOSIS — M25.512 CHRONIC LEFT SHOULDER PAIN: Primary | ICD-10-CM

## 2022-04-15 DIAGNOSIS — I25.10 CORONARY ARTERY DISEASE INVOLVING NATIVE HEART WITHOUT ANGINA PECTORIS, UNSPECIFIED VESSEL OR LESION TYPE: ICD-10-CM

## 2022-04-15 DIAGNOSIS — G62.9 NEUROPATHY: ICD-10-CM

## 2022-04-15 DIAGNOSIS — E11.9 TYPE 2 DIABETES MELLITUS WITHOUT COMPLICATION, WITHOUT LONG-TERM CURRENT USE OF INSULIN (HCC): ICD-10-CM

## 2022-04-15 DIAGNOSIS — G89.29 CHRONIC LEFT SHOULDER PAIN: Primary | ICD-10-CM

## 2022-04-15 DIAGNOSIS — I50.9 CONGESTIVE HEART FAILURE, UNSPECIFIED HF CHRONICITY, UNSPECIFIED HEART FAILURE TYPE (HCC): ICD-10-CM

## 2022-04-15 DIAGNOSIS — M54.9 CHRONIC BACK PAIN, UNSPECIFIED BACK LOCATION, UNSPECIFIED BACK PAIN LATERALITY: ICD-10-CM

## 2022-04-15 DIAGNOSIS — G89.29 CHRONIC BACK PAIN, UNSPECIFIED BACK LOCATION, UNSPECIFIED BACK PAIN LATERALITY: ICD-10-CM

## 2022-04-15 DIAGNOSIS — K08.89 PAIN, DENTAL: ICD-10-CM

## 2022-04-15 PROCEDURE — 99309 SBSQ NF CARE MODERATE MDM 30: CPT | Performed by: NURSE PRACTITIONER

## 2022-04-15 NOTE — PROGRESS NOTES
THIS IS NOT A COMPLETE MEDICAL RECORD ON THIS PATIENT. THIS IS A PATIENT AT [facility]. PLEASE CONTACT THE FACILITY LISTED BELOW FOR MORE INFORMATION ON THIS PATIENT. THE COMPLETE RECORD RESIDES WITH THIS LONG TERM CARE FACILITY. HISTORY OF PRESENT ILLNESS  Magda Machado is a 64 y.o. male. This patient is currently under the care of Dr. Oksana Arriola at [facility].  The patient was admitted to this facility following hospitalization related to acute respiratory failure, NSTEMI, CHF, and non-sustained SVT. His past medical history includes severe PVD with Buerger's disease, CHF, HTN, neuropathy, chronic pain, depression, tobacco use, alcohol abuse. Patient is seen today per his request.  He has had complaints of chronic shoulder pain. He was evaluated by Dr. Estella Vargas, orthopedics, yesterday, with concern for tendonitis. He was recommended to have course of medrol dose pack, PT, and OT, and follow-up in 6 weeks. Patient has had  pain of tooth to the left side of his mouth.  He is scheduled for tooth extraction in one week. He states increased dose of Percocet 7.5-325 mg po tid has been helpful with this as well as chronic back and shoulder pain. HPI    Review of Systems   Constitutional: Negative for chills and fever. HENT: Negative for congestion. Dental pain   Respiratory: Negative for cough and shortness of breath. Cardiovascular: Negative for chest pain. Gastrointestinal: Negative. Genitourinary: Negative. Musculoskeletal: Positive for back pain, joint pain, myalgias and neck pain. Neuropathy in feet   Neurological: Positive for tingling. Negative for tremors, focal weakness, seizures and headaches. Endo/Heme/Allergies: Negative. Physical Exam  Constitutional:       General: He is not in acute distress. HENT:      Head: Normocephalic and atraumatic.       Mouth/Throat:      Mouth: Mucous membranes are moist.   Eyes:      Conjunctiva/sclera: Conjunctivae normal.   Cardiovascular:      Rate and Rhythm: Normal rate and regular rhythm. Pulmonary:      Effort: Pulmonary effort is normal.      Breath sounds: Normal breath sounds. Abdominal:      General: Bowel sounds are normal. There is no distension. Tenderness: There is no abdominal tenderness. Musculoskeletal:      Right lower leg: No edema. Left lower leg: No edema. Skin:     General: Skin is warm and dry. Neurological:      Mental Status: He is alert. Psychiatric:         Mood and Affect: Mood normal.         ASSESSMENT and PLAN  Encounter Diagnoses   Name Primary?  Chronic left shoulder pain Yes    Chronic back pain, unspecified back location, unspecified back pain laterality     Pain, dental     Neuropathy     Congestive heart failure, unspecified HF chronicity, unspecified heart failure type ([address]     Coronary artery disease involving native heart without angina pectoris, unspecified vessel or lesion type     Type 2 diabetes mellitus without complication, without long-term current use of insulin ([address]      As per orthopedic recommendations, Medrol dosepack as per packaging instructions. Refer for PT/OT evaluation and treatment. May go on [facility] 4/16/22, to return to facility by midnight, per request.    Smoking cessation importance discussed. Reviewed medications and side effects in detail. Order clarification per pharmacy request:  Wellbutrin  mg po daily. Nursing to continue to monitor closely and notify MD/NP of any change in condition.

## 2022-04-18 ENCOUNTER — TELEPHONE (OUTPATIENT)
Dept: INTERNAL MEDICINE CLINIC | Age: 57
End: 2022-04-18

## 2022-04-18 NOTE — TELEPHONE ENCOUNTER
----- Message from Goyoericka Hidalgos sent at 4/15/2022  5:54 PM EDT -----  Subject: Message to Provider    QUESTIONS  Information for Provider? Rep called to verify order sent for pt to   [address] [place] [address] and requesting clarification. They would like to know   if the prescription is for Bupropion XR 150MG for Depression or Bupropion   XL for smoking sensation. They would also like to request continuation of   Tylenol 3 as the interaction makes med less effective. Unit 1 ext.  ---------------------------------------------------------------------------  --------------  CALL BACK INFO  What is the best way for the office to contact you? Do not leave any   message, patient will call back for answer  Preferred Call Back Phone Number? [phone]  ---------------------------------------------------------------------------  --------------  SCRIPT ANSWERS  Relationship to Patient? Third Party  Third Party Type? Other  Other Third Party Type? Alexys Busch  Representative Name?  Abbe Boast

## 2022-05-13 ENCOUNTER — EXTERNAL NURSING HOME DOCUMENTATION (OUTPATIENT)
Dept: INTERNAL MEDICINE CLINIC | Age: 57
End: 2022-05-13
Payer: MEDICAID

## 2022-05-13 DIAGNOSIS — R63.5 WEIGHT GAIN: Primary | ICD-10-CM

## 2022-05-13 DIAGNOSIS — E78.2 MIXED HYPERLIPIDEMIA: ICD-10-CM

## 2022-05-13 DIAGNOSIS — E11.9 TYPE 2 DIABETES MELLITUS WITHOUT COMPLICATION, WITHOUT LONG-TERM CURRENT USE OF INSULIN (HCC): ICD-10-CM

## 2022-05-13 DIAGNOSIS — I50.9 CONGESTIVE HEART FAILURE, UNSPECIFIED HF CHRONICITY, UNSPECIFIED HEART FAILURE TYPE (HCC): ICD-10-CM

## 2022-05-13 DIAGNOSIS — I25.10 CORONARY ARTERY DISEASE INVOLVING NATIVE HEART WITHOUT ANGINA PECTORIS, UNSPECIFIED VESSEL OR LESION TYPE: ICD-10-CM

## 2022-05-13 PROCEDURE — 99309 SBSQ NF CARE MODERATE MDM 30: CPT | Performed by: INTERNAL MEDICINE

## 2022-05-13 NOTE — PROGRESS NOTES
Progress Note     Subjective:  Rebeca Galloway is doing well in the nursing home. He has neck pain and mild weakness in the neck. Did see occupational therapy for five days, but insurance will not pay for more than that. He has been gaining weight. He is trying to watch carbohydrate. Heart failure seems under control. No leg edema. No shortness of breath. He is still smoking four times a day, trying to quit smoking. No other discomfort. Objective:    VITALS:  T:  98.6 degrees Fahrenheit. P:  71 per minute. BP:  124/76 mmHg. SaO2:  97% on room air. Weight is 269 pounds. He has gained 10% of the weight. He was 236 pounds. HEENT:  No abnormality detected. NECK:  Supple, no JVD, no carotid bruits, no thyromegaly. CHEST:  Chest is clear to auscultation bilaterally. No rales, no rhonchi. CARDIOVASCULAR:  S1, S2 normal.  Regular rate and rhythm. ABDOMEN:  Soft, nontender, nondistended, bowel sounds present. EXTREMITIES:  No edema noticed. Dorsalis pedis pulse normal.    NEUROLOGICAL:  Alert and oriented x3. Laboratory Data:   Last labs done this month. PSA was normal. No other labs that I can see. Assessment and Plan:   1. Systolic heart failure with reduced ejection fraction, compensated. He is taking metoprolol, losartan and spironolactone. We will recheck CMP. 2. Non-ST elevation MI. Cardiac cath was negative for any blockage. The patient is on aspirin, statin and beta blocker. 3. Severe PVD with Buerger's disease. The patient is on aspirin and statin. He is also on Percocet. 4. Depression. Stable depression on sertraline. We will check CMP. 5. Cervical radiculopathy. He is scheduled to see the orthopedist.  Otherwise he is doing well. THIS IS NOT A COMPLETE MEDICAL RECORD ON THIS PATIENT.    THIS IS A PATIENT AT [facility].    PLEASE CONTACT THE FACILITY LISTED BELOW FOR MORE INFORMATION ON THIS PATIENT.    THE COMPLETE RECORD RESIDES WITH THIS LONG TERM CARE FACILITY

## 2022-05-23 ENCOUNTER — OFFICE VISIT (OUTPATIENT)
Dept: INTERNAL MEDICINE CLINIC | Age: 57
End: 2022-05-23
Payer: MEDICAID

## 2022-05-23 DIAGNOSIS — E78.2 MIXED HYPERLIPIDEMIA: ICD-10-CM

## 2022-05-23 DIAGNOSIS — M54.9 CHRONIC BACK PAIN, UNSPECIFIED BACK LOCATION, UNSPECIFIED BACK PAIN LATERALITY: ICD-10-CM

## 2022-05-23 DIAGNOSIS — E11.9 TYPE 2 DIABETES MELLITUS WITHOUT COMPLICATION, WITHOUT LONG-TERM CURRENT USE OF INSULIN (HCC): ICD-10-CM

## 2022-05-23 DIAGNOSIS — I25.10 CORONARY ARTERY DISEASE INVOLVING NATIVE HEART WITHOUT ANGINA PECTORIS, UNSPECIFIED VESSEL OR LESION TYPE: ICD-10-CM

## 2022-05-23 DIAGNOSIS — G62.9 NEUROPATHY: ICD-10-CM

## 2022-05-23 DIAGNOSIS — G89.29 CHRONIC BACK PAIN, UNSPECIFIED BACK LOCATION, UNSPECIFIED BACK PAIN LATERALITY: ICD-10-CM

## 2022-05-23 DIAGNOSIS — K08.89 PAIN, DENTAL: ICD-10-CM

## 2022-05-23 DIAGNOSIS — G89.29 CHRONIC LEFT SHOULDER PAIN: ICD-10-CM

## 2022-05-23 DIAGNOSIS — I50.9 CONGESTIVE HEART FAILURE, UNSPECIFIED HF CHRONICITY, UNSPECIFIED HEART FAILURE TYPE (HCC): Primary | ICD-10-CM

## 2022-05-23 DIAGNOSIS — M25.512 CHRONIC LEFT SHOULDER PAIN: ICD-10-CM

## 2022-05-23 PROCEDURE — 99309 SBSQ NF CARE MODERATE MDM 30: CPT | Performed by: NURSE PRACTITIONER

## 2022-05-23 NOTE — PROGRESS NOTES
THIS IS NOT A COMPLETE MEDICAL RECORD ON THIS PATIENT. THIS IS A PATIENT AT [facility]. PLEASE CONTACT THE FACILITY LISTED BELOW FOR MORE INFORMATION ON THIS PATIENT. THE COMPLETE RECORD RESIDES WITH THIS LONG TERM CARE FACILITY. HISTORY OF PRESENT ILLNESS  Radha Santizo is a 64 y.o. male. This patient is currently under the care of Dr. Rufino Dale at [facility].  The patient was admitted to this facility following hospitalization related to acute respiratory failure, NSTEMI, CHF, and non-sustained SVT. His past medical history includes severe PVD with Buerger's disease, CHF, HTN, neuropathy, chronic pain, depression, tobacco use, alcohol abuse. Patient is seen today per his request.  He requests leave of absence from the facility for 48 hours next weekend. Patient has complaints of left-sided tooth pain at time of visit. He has had recent visit with oral surgery, with extraction planned, but states he has to have surgical clearance from his vascular specialist first.  These appointments have been scheduled for early June 2022. Blood Pressure: 132 /  78 mmHg  Temperature: 97.8 °F  Pulse: 68 bpm  Respirations: 18 Breaths/min  O2 Saturation: 99.0 %    HPI    Review of Systems   Constitutional: Negative for chills and fever. HENT: Negative for congestion. Dental pain   Respiratory: Negative for cough and shortness of breath. Cardiovascular: Negative for chest pain. Gastrointestinal: Negative. Genitourinary: Negative. Musculoskeletal: Positive for back pain, joint pain, myalgias and neck pain. Neuropathy in feet   Neurological: Positive for tingling. Negative for tremors, focal weakness, seizures and headaches. Endo/Heme/Allergies: Negative. Physical Exam  Constitutional:       General: He is not in acute distress. HENT:      Head: Normocephalic and atraumatic.       Mouth/Throat:      Mouth: Mucous membranes are moist.   Eyes:      Conjunctiva/sclera: Conjunctivae normal.   Cardiovascular:      Rate and Rhythm: Normal rate and regular rhythm. Pulmonary:      Effort: Pulmonary effort is normal.      Breath sounds: Normal breath sounds. Musculoskeletal:      Right lower leg: No edema. Left lower leg: No edema. Skin:     General: Skin is warm and dry. Neurological:      Mental Status: He is alert and oriented to person, place, and time. Psychiatric:         Mood and Affect: Mood normal.         ASSESSMENT and PLAN  Encounter Diagnoses   Name Primary?  Congestive heart failure, unspecified HF chronicity, unspecified heart failure type ([facility] [address] Yes    Coronary artery disease involving native heart without angina pectoris, unspecified vessel or lesion type     Type 2 diabetes mellitus without complication, without long-term current use of insulin (HCC)     Mixed hyperlipidemia     Chronic left shoulder pain     Chronic back pain, unspecified back location, unspecified back pain laterality     Pain, dental     Neuropathy      May go on SURI from facility 05/28/22-05/30/22. Nursing to request pharmacy to supply medications. Follow-up with oral surgery and vascular, as scheduled. Reviewed medications and side effects in detail. Patient questions why he is no longer taking losartan. Advised patient this medication was discontinued with [facility] was started by specialist.  Per chart review, however, Entresto order was placed for 4 months only and is now marked as completed. Will resume, Entresto  mg po bid. Nursing to monitor BP and pulse qshift x 2 weeks. Nursing to continue to monitor closely and notify MD/NP of any change in condition. Discussed above plan of care with Dr. Norberto Martin.

## 2022-06-17 ENCOUNTER — EXTERNAL NURSING HOME DOCUMENTATION (OUTPATIENT)
Dept: INTERNAL MEDICINE CLINIC | Age: 57
End: 2022-06-17
Payer: MEDICARE

## 2022-06-17 DIAGNOSIS — I50.9 CONGESTIVE HEART FAILURE, UNSPECIFIED HF CHRONICITY, UNSPECIFIED HEART FAILURE TYPE (HCC): ICD-10-CM

## 2022-06-17 DIAGNOSIS — N52.9 ERECTILE DYSFUNCTION, UNSPECIFIED ERECTILE DYSFUNCTION TYPE: Primary | ICD-10-CM

## 2022-06-17 DIAGNOSIS — E78.2 MIXED HYPERLIPIDEMIA: ICD-10-CM

## 2022-06-17 DIAGNOSIS — E11.9 TYPE 2 DIABETES MELLITUS WITHOUT COMPLICATION, WITHOUT LONG-TERM CURRENT USE OF INSULIN (HCC): ICD-10-CM

## 2022-06-17 DIAGNOSIS — I25.10 CORONARY ARTERY DISEASE INVOLVING NATIVE HEART WITHOUT ANGINA PECTORIS, UNSPECIFIED VESSEL OR LESION TYPE: ICD-10-CM

## 2022-06-17 PROCEDURE — 99309 SBSQ NF CARE MODERATE MDM 30: CPT | Performed by: INTERNAL MEDICINE

## 2022-06-17 NOTE — PROGRESS NOTES
Progress Note     Subjective:  Mr. Winston Alvarez is doing well. He recently has seen heart failure doctor and medications were changed. No shortness of breath or orthopnea. He had erectile dysfunction for which he was taking Cialis which he ran out. The patient would like to go back on Cialis. 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 Leg pain seems stable. Depression seems okay also. He is eating and sleeping good. Objective:    VITALS:  T:  97.9 degrees Fahrenheit. P:  83 per minute. BP:  138/79 mmHg. SaO2:  98% on room air. Weight is 266 pounds. HEENT:  No abnormality detected. NECK:  Supple, no JVD, no carotid bruits, no thyromegaly. CHEST:  Chest is clear to auscultation bilaterally. No rales, no rhonchi. CARDIOVASCULAR:  S1, S2 normal.  Regular rate and rhythm. ABDOMEN:  Soft, nontender, nondistended, bowel sounds present. EXTREMITIES:  Trace leg edema present. NEUROLOGICAL:  Alert and oriented x3. Assessment and Plan:   1. Erectile dysfunction. We will put him back on low dose of Cialis 2.5 mg once a day. 2. Congestive heart failure compensated. The patient has seen cardiologist and beta-blocker was changed from Toprol to [address]. We will continue it. He is also taking losartan and spironolactone. CMP seems stable. 3. Peripheral vascular disease with Buerger's disease. The patient is on aspirin and statin. We will continue it. 4. Depression on sertraline, doing well. THIS IS NOT A COMPLETE MEDICAL RECORD ON THIS PATIENT.    THIS IS A PATIENT AT [facility].    PLEASE CONTACT THE FACILITY LISTED BELOW FOR MORE INFORMATION ON THIS PATIENT.    THE COMPLETE RECORD RESIDES WITH THIS LONG TERM CARE FACILITY

## 2022-06-28 ENCOUNTER — EXTERNAL NURSING HOME DOCUMENTATION (OUTPATIENT)
Dept: INTERNAL MEDICINE CLINIC | Age: 57
End: 2022-06-28
Payer: MEDICARE

## 2022-06-28 DIAGNOSIS — I50.9 CONGESTIVE HEART FAILURE, UNSPECIFIED HF CHRONICITY, UNSPECIFIED HEART FAILURE TYPE (HCC): ICD-10-CM

## 2022-06-28 DIAGNOSIS — I25.10 CORONARY ARTERY DISEASE INVOLVING NATIVE HEART WITHOUT ANGINA PECTORIS, UNSPECIFIED VESSEL OR LESION TYPE: ICD-10-CM

## 2022-06-28 DIAGNOSIS — G89.29 CHRONIC BACK PAIN, UNSPECIFIED BACK LOCATION, UNSPECIFIED BACK PAIN LATERALITY: ICD-10-CM

## 2022-06-28 DIAGNOSIS — E11.9 TYPE 2 DIABETES MELLITUS WITHOUT COMPLICATION, WITHOUT LONG-TERM CURRENT USE OF INSULIN (HCC): ICD-10-CM

## 2022-06-28 DIAGNOSIS — M54.9 CHRONIC BACK PAIN, UNSPECIFIED BACK LOCATION, UNSPECIFIED BACK PAIN LATERALITY: ICD-10-CM

## 2022-06-28 DIAGNOSIS — N28.9 RENAL INSUFFICIENCY: Primary | ICD-10-CM

## 2022-06-28 PROCEDURE — 99309 SBSQ NF CARE MODERATE MDM 30: CPT | Performed by: INTERNAL MEDICINE

## 2022-06-28 NOTE — PROGRESS NOTES
Progress Note     Subjective:  Mr. Thaddeus Vo is doing well. He has congestive heart failure. He has seen new heart failure cardiologist Dr. Roger Boyce from [address] [place] [address].  Medications were adjusted, but lab work done showed his kidney function test was slightly reduced. He has no shortness of breath. He is watching his salt intake. No weight gain right now. Objective:    VITALS:  T:  97.5 degrees Fahrenheit. P:  83 per minute. BP:  139/89 mmHg. SaO2:  98% on room air. Weight is 268 pounds. HEENT:  No abnormality detected. NECK:  Supple, no JVD, no carotid bruits, no thyromegaly. CHEST:  Chest is clear to auscultation bilaterally. No rales, no rhonchi. CARDIOVASCULAR:  S1, S2 normal.  Regular rate and rhythm. ABDOMEN:  Soft, nontender, nondistended, bowel sounds present. EXTREMITIES:  No edema is noticed. Dorsalis pedis pulse normal.    NEUROLOGICAL:  Alert and oriented x3. Laboratory Data:   Labs were reviewed. CBC seems stable. CMP showed BUN and creatinine elevated. Assessment and Plan:   1. Congestive heart failure, compensated. The patient is on Entresto and Coreg. Also taking spironolactone and Bumex. We will reduce Bumex dosage to 1 mg 5 days a week and 0.5 mg two days a week. We will repeat BNP in a week. 2. Depression on sertraline doing well. 3. Peripheral vascular disease with Buerger's disease. The patient is on aspirin and statin. We will continue it. 4. Erectile dysfunction. The patient is on low dose of Cialis, doing well. THIS IS NOT A COMPLETE MEDICAL RECORD ON THIS PATIENT.    THIS IS A PATIENT AT [facility].    PLEASE CONTACT THE FACILITY LISTED BELOW FOR MORE INFORMATION ON THIS PATIENT.    THE COMPLETE RECORD RESIDES WITH THIS LONG TERM CARE FACILITY

## 2022-07-01 ENCOUNTER — OFFICE VISIT (OUTPATIENT)
Dept: INTERNAL MEDICINE CLINIC | Age: 57
End: 2022-07-01
Payer: MEDICARE

## 2022-07-01 DIAGNOSIS — G62.9 NEUROPATHY: ICD-10-CM

## 2022-07-01 DIAGNOSIS — I25.10 CORONARY ARTERY DISEASE INVOLVING NATIVE HEART WITHOUT ANGINA PECTORIS, UNSPECIFIED VESSEL OR LESION TYPE: ICD-10-CM

## 2022-07-01 DIAGNOSIS — G89.29 CHRONIC BACK PAIN, UNSPECIFIED BACK LOCATION, UNSPECIFIED BACK PAIN LATERALITY: ICD-10-CM

## 2022-07-01 DIAGNOSIS — I50.9 CONGESTIVE HEART FAILURE, UNSPECIFIED HF CHRONICITY, UNSPECIFIED HEART FAILURE TYPE (HCC): Primary | ICD-10-CM

## 2022-07-01 DIAGNOSIS — E78.2 MIXED HYPERLIPIDEMIA: ICD-10-CM

## 2022-07-01 DIAGNOSIS — M54.9 CHRONIC BACK PAIN, UNSPECIFIED BACK LOCATION, UNSPECIFIED BACK PAIN LATERALITY: ICD-10-CM

## 2022-07-01 DIAGNOSIS — E11.9 TYPE 2 DIABETES MELLITUS WITHOUT COMPLICATION, WITHOUT LONG-TERM CURRENT USE OF INSULIN (HCC): ICD-10-CM

## 2022-07-01 DIAGNOSIS — N28.9 RENAL INSUFFICIENCY: ICD-10-CM

## 2022-07-01 DIAGNOSIS — M25.512 CHRONIC LEFT SHOULDER PAIN: ICD-10-CM

## 2022-07-01 DIAGNOSIS — G89.29 CHRONIC LEFT SHOULDER PAIN: ICD-10-CM

## 2022-07-01 PROCEDURE — G8432 DEP SCR NOT DOC, RNG: HCPCS | Performed by: NURSE PRACTITIONER

## 2022-07-01 PROCEDURE — 99308 SBSQ NF CARE LOW MDM 20: CPT | Performed by: NURSE PRACTITIONER

## 2022-07-01 NOTE — PROGRESS NOTES
THIS IS NOT A COMPLETE MEDICAL RECORD ON THIS PATIENT. THIS IS A PATIENT AT [facility]. PLEASE CONTACT THE FACILITY LISTED BELOW FOR MORE INFORMATION ON THIS PATIENT. THE COMPLETE RECORD RESIDES WITH THIS LONG TERM CARE FACILITY. HISTORY OF PRESENT ILLNESS  Renu Chao is a 64 y.o. male. This patient is currently under the care of Dr. Aysha Ma at [facility].  The patient was admitted to this facility following hospitalization related to acute respiratory failure, NSTEMI, CHF, and non-sustained SVT. His past medical history includes severe PVD with Buerger's disease, CHF, HTN, neuropathy, chronic pain, depression, tobacco use, alcohol abuse. Patient is seen today per his request.  He requests leave of absence from the facility for 48 hours next weekend. HPI    Review of Systems   Constitutional: Negative for chills and fever. HENT: Negative for congestion. Respiratory: Negative for cough and shortness of breath. Cardiovascular: Negative for chest pain. Gastrointestinal: Negative. Genitourinary: Negative. Musculoskeletal: Positive for back pain, joint pain, myalgias and neck pain. Neuropathy in feet   Neurological: Positive for tingling. Negative for tremors, focal weakness, seizures and headaches. Endo/Heme/Allergies: Negative. Physical Exam  Constitutional:       General: He is not in acute distress. HENT:      Head: Normocephalic and atraumatic. Mouth/Throat:      Mouth: Mucous membranes are moist.   Eyes:      Conjunctiva/sclera: Conjunctivae normal.   Musculoskeletal:      Right lower leg: No edema. Left lower leg: No edema. Skin:     General: Skin is warm and dry. Neurological:      Mental Status: He is alert and oriented to person, place, and time. Psychiatric:         Mood and Affect: Mood normal.         ASSESSMENT and PLAN  Encounter Diagnoses   Name Primary?     Congestive heart failure, unspecified HF chronicity, unspecified heart failure type ([facility] [address] Yes    Type 2 diabetes mellitus without complication, without long-term current use of insulin ([facility] [address]     Coronary artery disease involving native heart without angina pectoris, unspecified vessel or lesion type     Renal insufficiency     Chronic back pain, unspecified back location, unspecified back pain laterality     Chronic left shoulder pain     Mixed hyperlipidemia     Neuropathy      May go on SURI from facility 06/08/22-06/10/22.   Nursing to request pharmacy to supply medications.     Follow-up with oral surgery and vascular, as scheduled.     Nursing to continue to monitor closely and notify MD/NP of any change in condition.

## 2022-07-12 ENCOUNTER — EXTERNAL NURSING HOME DOCUMENTATION (OUTPATIENT)
Dept: INTERNAL MEDICINE CLINIC | Age: 57
End: 2022-07-12
Payer: MEDICARE

## 2022-07-12 DIAGNOSIS — E11.9 TYPE 2 DIABETES MELLITUS WITHOUT COMPLICATION, WITHOUT LONG-TERM CURRENT USE OF INSULIN (HCC): ICD-10-CM

## 2022-07-12 DIAGNOSIS — I73.9 PAD (PERIPHERAL ARTERY DISEASE) (HCC): ICD-10-CM

## 2022-07-12 DIAGNOSIS — I50.9 CONGESTIVE HEART FAILURE, UNSPECIFIED HF CHRONICITY, UNSPECIFIED HEART FAILURE TYPE (HCC): Primary | ICD-10-CM

## 2022-07-12 DIAGNOSIS — I25.10 CORONARY ARTERY DISEASE INVOLVING NATIVE HEART WITHOUT ANGINA PECTORIS, UNSPECIFIED VESSEL OR LESION TYPE: ICD-10-CM

## 2022-07-12 PROCEDURE — 99309 SBSQ NF CARE MODERATE MDM 30: CPT | Performed by: INTERNAL MEDICINE

## 2022-07-12 NOTE — PROGRESS NOTES
Progress Note     Subjective:  Mr. Connie Hirsch is doing well in the nursing home. I have reduced his Bumex because he is having renal insufficiency with increased dosage of Bumex. His breathing seems okay. No shortness of breath or orthopnea. Leg swelling is not there. Overall, he is feeling better. He is continuing exercise. No other discomfort. Objective:    VITALS:  T:  97.5 degrees Fahrenheit. P:  83 per minute. BP:  140/70 mmHg. SaO2:  97% on room air. Weight is 256 pounds. HEENT:  No abnormality detected. NECK:  Supple, no JVD, no carotid bruits, no thyromegaly. CHEST:  Chest is clear to auscultation bilaterally. No rales, no rhonchi. CARDIOVASCULAR:  S1, S2 normal.  Regular rate and rhythm. ABDOMEN:  Soft, nontender, nondistended, bowel sounds present. EXTREMITIES:  No edema is noted. Dorsalis pedis pulse normal.    NEUROLOGICAL:  Alert and oriented x3. No neurological deficits. Assessment and Plan:   1. Congestive heart failure. Bumex dosage was reduced. The patient is still compensated. We will continue Coreg and Entresto. The patient is also taking spironolactone. He has seen cardiologist.  His ejection fraction has improved over 30%. He was scheduled to have ICD placement done soon. 2. Peripheral vascular disease with Buerger's disease. The patient is on aspirin and statin. Leg pain has improved. 3. Depression, doing well on Sertraline. 4. Leg pain, on pain medications, doing well. THIS IS NOT A COMPLETE MEDICAL RECORD ON THIS PATIENT.    THIS IS A PATIENT AT [facility].    PLEASE CONTACT THE FACILITY LISTED BELOW FOR MORE INFORMATION ON THIS PATIENT.    THE COMPLETE RECORD RESIDES WITH THIS LONG TERM CARE FACILITY

## 2022-07-19 ENCOUNTER — OFFICE VISIT (OUTPATIENT)
Dept: ORTHOPEDIC SURGERY | Age: 57
End: 2022-07-19
Payer: MEDICARE

## 2022-07-19 VITALS — HEIGHT: 76 IN | BODY MASS INDEX: 31.66 KG/M2 | WEIGHT: 260 LBS

## 2022-07-19 DIAGNOSIS — M54.2 NECK PAIN: Primary | ICD-10-CM

## 2022-07-19 DIAGNOSIS — M50.90 CERVICAL DISC DISEASE: ICD-10-CM

## 2022-07-19 PROCEDURE — G8427 DOCREV CUR MEDS BY ELIG CLIN: HCPCS | Performed by: ORTHOPAEDIC SURGERY

## 2022-07-19 PROCEDURE — G8417 CALC BMI ABV UP PARAM F/U: HCPCS | Performed by: ORTHOPAEDIC SURGERY

## 2022-07-19 PROCEDURE — 99214 OFFICE O/P EST MOD 30 MIN: CPT | Performed by: ORTHOPAEDIC SURGERY

## 2022-07-19 PROCEDURE — G8432 DEP SCR NOT DOC, RNG: HCPCS | Performed by: ORTHOPAEDIC SURGERY

## 2022-07-19 PROCEDURE — 3017F COLORECTAL CA SCREEN DOC REV: CPT | Performed by: ORTHOPAEDIC SURGERY

## 2022-07-19 RX ORDER — OXYCODONE AND ACETAMINOPHEN 5; 325 MG/1; MG/1
TABLET ORAL
COMMUNITY
Start: 2022-04-11 | End: 2022-08-29

## 2022-07-19 RX ORDER — BUPROPION HYDROCHLORIDE 150 MG/1
TABLET ORAL
COMMUNITY
Start: 2022-07-11 | End: 2022-08-29 | Stop reason: SDUPTHER

## 2022-07-19 RX ORDER — METFORMIN HYDROCHLORIDE 500 MG/1
1 TABLET ORAL DAILY
COMMUNITY
Start: 2022-02-20 | End: 2022-08-29

## 2022-07-19 RX ORDER — IBUPROFEN 800 MG/1
TABLET ORAL
COMMUNITY
Start: 2022-07-18

## 2022-07-19 RX ORDER — GABAPENTIN 400 MG/1
CAPSULE ORAL
COMMUNITY
Start: 2022-07-07 | End: 2022-08-29

## 2022-07-19 RX ORDER — DAPAGLIFLOZIN 5 MG/1
TABLET, FILM COATED ORAL
COMMUNITY
Start: 2022-07-11 | End: 2022-08-29 | Stop reason: SDUPTHER

## 2022-07-19 RX ORDER — TRAZODONE HYDROCHLORIDE 50 MG/1
TABLET ORAL
COMMUNITY
Start: 2022-06-16 | End: 2022-08-29 | Stop reason: SDUPTHER

## 2022-07-20 NOTE — PROGRESS NOTES
Leora Robin (: 1965) is a 64 y.o. male, patient, here for evaluation of the following chief complaint(s):  Neck Pain       ASSESSMENT/PLAN:    Below is the assessment and plan developed based on review of pertinent history, physical exam, labs, studies, and medications. He has chronic neck pain and left shoulder pain. He has radiation to the upper shoulder and left upper arm. He is done physical therapy without improvement either the neck or shoulder. I will get an MRI of his cervical spine and see if he has any significant compressive pathology at. He may benefit from injection therapy. He will see us back with the test results. 1. Neck pain  -     XR SPINE CERV 4 OR 5 V; Future  -     MRI CERV SPINE WO CONT; Future  2. Cervical disc disease      No follow-ups on file. SUBJECTIVE/OBJECTIVE:  Leora Robin (: 1965) is a 64 y.o. male. No flowsheet data found. He comes in today for an evaluation for chronic neck pain. He has been seen before by sports medicine for his left shoulder. He has had increasing neck pain and left shoulder pain. He feels that the neck pain is mainly in the mid line at the cervical thoracic junction. There is some radiation to the left shoulder. It does not radiate into the hands or the arms. He denies any paresthesias. He denies any bowel bladder difficulties. The pain is activity related. He has had physical therapy for the neck and shoulder without significant provement. Imaging:    XR Results (most recent):  Results from Appointment encounter on 22    XR SPINE CERV 4 OR 5 V    Narrative  4 views of the cervical spine demonstrates mild straightening of cervical lordosis. No subluxations fractures or lytic lesions are noted. Maintained cervical lordosis.   Mild early degenerative disc base narrowing is noted                   MRI Results (most recent):    No recent cervical MRI      Allergies   Allergen Reactions    Codeine Other (comments)    Lisinopril Other (comments)       Current Outpatient Medications   Medication Sig    buPROPion XL (WELLBUTRIN XL) 150 mg tablet     metFORMIN (GLUCOPHAGE) 500 mg tablet Take 1 Tablet by mouth daily. oxyCODONE-acetaminophen (PERCOCET) 5-325 mg per tablet     gabapentin (NEURONTIN) 400 mg capsule     traZODone (DESYREL) 50 mg tablet     ibuprofen (MOTRIN) 800 mg tablet     Farxiga 5 mg tab tablet     gabapentin (NEURONTIN) 100 mg capsule Take 1 Capsule by mouth nightly. Max Daily Amount: 100 mg.    aspirin 81 mg chewable tablet Take 1 Tablet by mouth daily. bumetanide (BUMEX) 1 mg tablet Take 1 Tablet by mouth daily. gabapentin (NEURONTIN) 300 mg capsule Take 1 Capsule by mouth three (3) times daily. Max Daily Amount: 900 mg.    losartan (COZAAR) 25 mg tablet Take 0.5 Tablets by mouth daily. lovastatin (MEVACOR) 20 mg tablet Take 1 Tablet by mouth nightly. metoprolol succinate (TOPROL-XL) 25 mg XL tablet Take 1 Tablet by mouth daily. sertraline (ZOLOFT) 25 mg tablet Take 1 Tablet by mouth daily. spironolactone (ALDACTONE) 25 mg tablet Take 1 Tablet by mouth daily. No current facility-administered medications for this visit. History reviewed. No pertinent past medical history. History reviewed. No pertinent surgical history. History reviewed. No pertinent family history. Social History     Tobacco Use    Smoking status: Every Day    Smokeless tobacco: Never   Vaping Use    Vaping Use: Never used   Substance Use Topics    Alcohol use: Not Currently    Drug use: Never        Review of Systems       Vitals:  Ht 6' 4\" (1.93 m)   Wt 260 lb (117.9 kg)   BMI 31.65 kg/m²    Body mass index is 31.65 kg/m². Ortho Exam       Alert and [place]  x 3    Normal gait and station; normal posture    No assistive devices today.     Lumbar spine:  Examination of the lumbar spine demonstrates no tenderness on palpation, no pain, no swelling or edema, with normal lumbar range of motion. Thoracic spine: Examination of the thoracic spine demonstrates no tenderness on palpation, no pain, no swelling or edema. Normal sensation and range of motion. Cervical spine:     Examination of the cervical spine demonstrates on inspection: No abnormal cutaneous markings. No shoulder asymmetry. No surgical incision. No masses. On palpation: Tenderness on palpation of left superior trap region. Radiation to the upper deltoid shoulder region. No distal radiation noted. Range of motion: Good cervical range of motion is noted. Full flexion-extension. No guarding noted    Motor examination:  Right deltoid 5/5,  left deltoid 5/5, right bicep 5/5, left bicep 5/5, right wrist extensor 5/5, left wrist extensor 5/5, right triceps 5/5, left triceps 5/5, right intrinsics 5/5, left intrinsics    Sensory examination: Reveals no deficits today    Reflexes: Left bicep 2/2, left triceps 2/2, right biceps 2/2, right triceps 2/2    Functional testing: Spurling's exam is mildly positive to the left; upper limb tension test is negative    Medley's signs negative bilaterally. An electronic signature was used to authenticate this note.   -- Sheryle Dus, MD

## 2022-07-20 NOTE — PATIENT INSTRUCTIONS
Neck: Exercises  Introduction  Here are some examples of exercises for you to try. The exercises may be suggested for a condition or for rehabilitation. Start each exercise slowly. Ease off the exercises if you start to have pain. You will be told when to start these exercises and which ones will work best for you. How to do the exercises  Neck stretch    This stretch works best if you keep your shoulder down as you lean away from it. To help you remember to do this, start by relaxing your shoulders and lightly holding on to your thighs or your chair. Tilt your head toward your shoulder and hold for 15 to 30 seconds. Let the weight of your head stretch your muscles. If you would like a little added stretch, use your hand to gently and steadily pull your head toward your shoulder. For example, keeping your right shoulder down, lean your head to the left. Repeat 2 to 4 times toward each shoulder. Diagonal neck stretch    Turn your head slightly toward the direction you will be stretching, and tilt your head diagonally toward your chest and hold for 15 to 30 seconds. If you would like a little added stretch, use your hand to gently and steadily pull your head forward on the diagonal.  Repeat 2 to 4 times toward each side. Dorsal glide stretch    The dorsal glide stretches the back of the neck. If you feel pain, do not glide so far back. Some people find this exercise easier to do while lying on their backs with an ice pack on the neck. Sit or stand tall and look straight ahead. Slowly tuck your chin as you glide your head backward over your body  Hold for a count of 6, and then relax for up to 10 seconds. Repeat 8 to 12 times. Chest and shoulder stretch    Sit or stand tall and glide your head backward as in the dorsal glide stretch. Raise both arms so that your hands are next to your ears. Take a deep breath, and as you breathe out, lower your elbows down and behind your back.  You will feel your shoulder blades slide down and together, and at the same time you will feel a stretch across your chest and the front of your shoulders. Hold for about 6 seconds, and then relax for up to 10 seconds. Repeat 8 to 12 times. Strengthening: Hands on head    Move your head backward, forward, and side to side against gentle pressure from your hands, holding each position for about 6 seconds. Repeat 8 to 12 times. Follow-up care is a key part of your treatment and safety. Be sure to make and go to all appointments, and call your doctor if you are having problems. It's also a good idea to know your test results and keep a list of the medicines you take. Where can you learn more? Go to http://www.mejía.com/  Enter P975 in the search box to learn more about \"Neck: Exercises. \"  Current as of: July 1, 2021               Content Version: 13.2  © 2006-2022 [employer]. Care instructions adapted under license by [employer] (which disclaims liability or warranty for this information). If you have questions about a medical condition or this instruction, always ask your healthcare professional. [place] [address] any warranty or liability for your use of this information.

## 2022-08-29 ENCOUNTER — OFFICE VISIT (OUTPATIENT)
Dept: INTERNAL MEDICINE CLINIC | Age: 57
End: 2022-08-29
Payer: MEDICARE

## 2022-08-29 DIAGNOSIS — E11.9 TYPE 2 DIABETES MELLITUS WITHOUT COMPLICATION, WITHOUT LONG-TERM CURRENT USE OF INSULIN (HCC): ICD-10-CM

## 2022-08-29 DIAGNOSIS — I73.9 PAD (PERIPHERAL ARTERY DISEASE) (HCC): ICD-10-CM

## 2022-08-29 DIAGNOSIS — E78.2 MIXED HYPERLIPIDEMIA: ICD-10-CM

## 2022-08-29 DIAGNOSIS — J30.9 ALLERGIC RHINITIS, UNSPECIFIED SEASONALITY, UNSPECIFIED TRIGGER: ICD-10-CM

## 2022-08-29 DIAGNOSIS — N28.9 RENAL INSUFFICIENCY: ICD-10-CM

## 2022-08-29 DIAGNOSIS — E53.8 VITAMIN B12 DEFICIENCY: ICD-10-CM

## 2022-08-29 DIAGNOSIS — E55.9 VITAMIN D DEFICIENCY: ICD-10-CM

## 2022-08-29 DIAGNOSIS — G62.9 NEUROPATHY: ICD-10-CM

## 2022-08-29 DIAGNOSIS — G89.29 CHRONIC BACK PAIN, UNSPECIFIED BACK LOCATION, UNSPECIFIED BACK PAIN LATERALITY: ICD-10-CM

## 2022-08-29 DIAGNOSIS — N52.9 ERECTILE DYSFUNCTION, UNSPECIFIED ERECTILE DYSFUNCTION TYPE: ICD-10-CM

## 2022-08-29 DIAGNOSIS — M54.9 CHRONIC BACK PAIN, UNSPECIFIED BACK LOCATION, UNSPECIFIED BACK PAIN LATERALITY: ICD-10-CM

## 2022-08-29 DIAGNOSIS — I50.9 CONGESTIVE HEART FAILURE, UNSPECIFIED HF CHRONICITY, UNSPECIFIED HEART FAILURE TYPE (HCC): Primary | ICD-10-CM

## 2022-08-29 DIAGNOSIS — I25.10 CORONARY ARTERY DISEASE INVOLVING NATIVE HEART WITHOUT ANGINA PECTORIS, UNSPECIFIED VESSEL OR LESION TYPE: ICD-10-CM

## 2022-08-29 DIAGNOSIS — F32.A DEPRESSION, UNSPECIFIED DEPRESSION TYPE: ICD-10-CM

## 2022-08-29 PROCEDURE — 99315 NF DSCHRG MGMT 30 MIN/LESS: CPT | Performed by: NURSE PRACTITIONER

## 2022-08-29 PROCEDURE — G8432 DEP SCR NOT DOC, RNG: HCPCS | Performed by: NURSE PRACTITIONER

## 2022-08-29 RX ORDER — BUPROPION HYDROCHLORIDE 150 MG/1
150 TABLET ORAL
Qty: 30 TABLET | Refills: 0 | Status: SHIPPED | OUTPATIENT
Start: 2022-08-29

## 2022-08-29 RX ORDER — CARVEDILOL 12.5 MG/1
12.5 TABLET ORAL 2 TIMES DAILY WITH MEALS
Qty: 60 TABLET | Refills: 0 | Status: SHIPPED | OUTPATIENT
Start: 2022-08-29

## 2022-08-29 RX ORDER — OXYCODONE AND ACETAMINOPHEN 7.5; 325 MG/1; MG/1
1 TABLET ORAL 3 TIMES DAILY
Qty: 90 TABLET | Refills: 0 | Status: SHIPPED | OUTPATIENT
Start: 2022-08-29 | End: 2022-09-28

## 2022-08-29 RX ORDER — LANOLIN ALCOHOL/MO/W.PET/CERES
500 CREAM (GRAM) TOPICAL DAILY
Qty: 30 TABLET | Refills: 0 | Status: SHIPPED | OUTPATIENT
Start: 2022-08-29

## 2022-08-29 RX ORDER — SPIRONOLACTONE 25 MG/1
25 TABLET ORAL DAILY
Qty: 30 TABLET | Refills: 0 | Status: SHIPPED | OUTPATIENT
Start: 2022-08-29

## 2022-08-29 RX ORDER — GABAPENTIN 400 MG/1
400 CAPSULE ORAL 3 TIMES DAILY
Qty: 90 CAPSULE | Refills: 0 | Status: SHIPPED | OUTPATIENT
Start: 2022-08-29

## 2022-08-29 RX ORDER — SACUBITRIL AND VALSARTAN 97; 103 MG/1; MG/1
1 TABLET, FILM COATED ORAL 2 TIMES DAILY
Qty: 60 TABLET | Refills: 0 | Status: SHIPPED | OUTPATIENT
Start: 2022-08-29

## 2022-08-29 RX ORDER — FLUTICASONE PROPIONATE 50 MCG
2 SPRAY, SUSPENSION (ML) NASAL DAILY
Qty: 1 EACH | Refills: 0 | Status: SHIPPED | OUTPATIENT
Start: 2022-08-29

## 2022-08-29 RX ORDER — TRAZODONE HYDROCHLORIDE 50 MG/1
50 TABLET ORAL
Qty: 30 TABLET | Refills: 0 | Status: SHIPPED | OUTPATIENT
Start: 2022-08-29

## 2022-08-29 RX ORDER — TADALAFIL 2.5 MG/1
2.5 TABLET ORAL DAILY
Qty: 30 TABLET | Refills: 0 | Status: SHIPPED | OUTPATIENT
Start: 2022-08-29

## 2022-08-29 RX ORDER — CETIRIZINE HCL 10 MG
10 TABLET ORAL DAILY
Qty: 30 TABLET | Refills: 0 | Status: SHIPPED | OUTPATIENT
Start: 2022-08-29

## 2022-08-29 RX ORDER — BUMETANIDE 0.5 MG/1
TABLET ORAL
Qty: 30 TABLET | Refills: 0 | Status: SHIPPED | OUTPATIENT
Start: 2022-08-29

## 2022-08-29 RX ORDER — METFORMIN HYDROCHLORIDE 500 MG/1
500 TABLET, EXTENDED RELEASE ORAL
Qty: 30 TABLET | Refills: 0 | Status: SHIPPED | OUTPATIENT
Start: 2022-08-29

## 2022-08-29 RX ORDER — MELATONIN
1000 DAILY
Qty: 30 TABLET | Refills: 0 | Status: SHIPPED | OUTPATIENT
Start: 2022-08-29

## 2022-08-29 RX ORDER — GUAIFENESIN 100 MG/5ML
81 LIQUID (ML) ORAL DAILY
Qty: 30 TABLET | Refills: 0 | Status: SHIPPED | OUTPATIENT
Start: 2022-08-29

## 2022-08-29 RX ORDER — LOVASTATIN 20 MG/1
40 TABLET ORAL
Qty: 60 TABLET | Refills: 0 | Status: SHIPPED | OUTPATIENT
Start: 2022-08-29

## 2022-08-29 NOTE — PROGRESS NOTES
THIS IS NOT A COMPLETE MEDICAL RECORD ON THIS PATIENT. THIS IS A PATIENT AT [facility]. PLEASE CONTACT THE FACILITY LISTED BELOW FOR MORE INFORMATION ON THIS PATIENT. THE COMPLETE RECORD RESIDES WITH THIS LONG TERM CARE FACILITY. HISTORY OF PRESENT ILLNESS  Lyssa Serrato is a 64 y.o. male. This patient is currently under the care of Dr. Wenceslao Caputo at [facility].  The patient was admitted to this facility following hospitalization related to acute respiratory failure, NSTEMI, CHF, and non-sustained SVT. His past medical history includes severe PVD with Buerger's disease, CHF, HTN, neuropathy, chronic pain, depression, tobacco use, and alcohol abuse. The patient plans for discharge from facility on 09/03/22. He plans to discharge to a motel in [address]. He states he has established a reservation. He plans to work with the [employer] upon arrival to [facility] to establish permanent housing. Patient states he has plans to establish with PCP in [facility] upon arrival and has discussed transfer of his care with his specialists and been provided with contact information for specialty providers in the area. Patient is generally feeling well at the time of today's visit and states he is prepared for discharge. HPI    Review of Systems   Constitutional:  Negative for chills and fever. HENT:  Negative for congestion. Respiratory:  Negative for cough and shortness of breath. Cardiovascular:  Negative for chest pain. Gastrointestinal: Negative. Genitourinary: Negative. Musculoskeletal:  Positive for back pain, joint pain, myalgias and neck pain. Neuropathy in feet   Neurological:  Positive for tingling. Negative for tremors, focal weakness, seizures and headaches. Endo/Heme/Allergies: Negative. Physical Exam  Constitutional:       General: He is not in acute distress. HENT:      Head: Normocephalic and atraumatic. Nose: No congestion. Mouth/Throat:      Mouth: Mucous membranes are moist.   Eyes:      Conjunctiva/sclera: Conjunctivae normal.   Cardiovascular:      Rate and Rhythm: Normal rate and regular rhythm. Pulmonary:      Effort: Pulmonary effort is normal. No respiratory distress. Breath sounds: Normal breath sounds. No wheezing or rales. Abdominal:      General: Bowel sounds are normal.   Musculoskeletal:      Right lower leg: No edema. Left lower leg: No edema. Skin:     General: Skin is warm and dry. Neurological:      Mental Status: He is alert and oriented to person, place, and time. Psychiatric:         Mood and Affect: Mood normal.       ASSESSMENT and PLAN  Encounter Diagnoses   Name Primary? Congestive heart failure, unspecified HF chronicity, unspecified heart failure type ([facility] [address] Yes    Coronary artery disease involving native heart without angina pectoris, unspecified vessel or lesion type     Type 2 diabetes mellitus without complication, without long-term current use of insulin (HCC)     PAD (peripheral artery disease) ([facility])     Mixed hyperlipidemia     Renal insufficiency     Erectile dysfunction, unspecified erectile dysfunction type     Chronic back pain, unspecified back location, unspecified back pain laterality     Neuropathy     Depression, unspecified depression type     Vitamin D deficiency     Vitamin B12 deficiency     Allergic rhinitis, unspecified seasonality, unspecified trigger        Will provide prescriptions for 30 day supply of medications at discharge:  Orders Placed This Encounter    spironolactone (ALDACTONE) 25 mg tablet     Sig: Take 1 Tablet by mouth daily. Dispense:  30 Tablet     Refill:  0    cholecalciferol (VITAMIN D3) (1000 Units /25 mcg) tablet     Sig: Take 1 Tablet by mouth daily. Dispense:  30 Tablet     Refill:  0    aspirin 81 mg chewable tablet     Sig: Take 1 Tablet by mouth daily.      Dispense:  30 Tablet     Refill:  0    cyanocobalamin (VITAMIN B12) 500 mcg tablet     Sig: Take 1 Tablet by mouth daily. Dispense:  30 Tablet     Refill:  0    gabapentin (NEURONTIN) 400 mg capsule     Sig: Take 1 Capsule by mouth three (3) times daily. Max Daily Amount: 1,200 mg. Dispense:  90 Capsule     Refill:  0    fluticasone propionate (FLONASE) 50 mcg/actuation nasal spray     Si Sprays by Both Nostrils route daily. Dispense:  1 Each     Refill:  0    cetirizine (ZYRTEC) 10 mg tablet     Sig: Take 1 Tablet by mouth daily. Dispense:  30 Tablet     Refill:  0    dapagliflozin (Farxiga) 5 mg tab tablet     Sig: Take 1 Tablet by mouth daily. Dispense:  30 Tablet     Refill:  0    metFORMIN ER (GLUCOPHAGE XR) 500 mg tablet     Sig: Take 1 Tablet by mouth daily (with dinner). Dispense:  30 Tablet     Refill:  0    oxyCODONE-acetaminophen (PERCOCET 7.5) 7.5-325 mg per tablet     Sig: Take 1 Tablet by mouth three (3) times daily for 30 days. Max Daily Amount: 3 Tablets. Dispense:  90 Tablet     Refill:  0    buPROPion XL (WELLBUTRIN XL) 150 mg tablet     Sig: Take 1 Tablet by mouth every morning. Dispense:  30 Tablet     Refill:  0    lovastatin (MEVACOR) 20 mg tablet     Sig: Take 2 Tablets by mouth nightly. Dispense:  60 Tablet     Refill:  0    traZODone (DESYREL) 50 mg tablet     Sig: Take 1 Tablet by mouth nightly. Dispense:  30 Tablet     Refill:  0    sacubitriL-valsartan (Entresto)  mg tablet     Sig: Take 1 Tablet by mouth two (2) times a day. Dispense:  60 Tablet     Refill:  0    tadalafiL (Cialis) 2.5 mg tablet     Sig: Take 1 Tablet by mouth daily. Dispense:  30 Tablet     Refill:  0    bumetanide (BUMEX) 0.5 mg tablet     Si tab po every Monday, Tuesday, Wednesday, Thursday, and Friday     Dispense:  30 Tablet     Refill:  0    carvediloL (COREG) 12.5 mg tablet     Sig: Take 1 Tablet by mouth two (2) times daily (with meals).      Dispense:  60 Tablet     Refill:  0      Patient to follow-up with PCP within 2-4 weeks of discharge and specialists as scheduled. Nursing to continue to monitor closely and notify MD/NP of any change in condition prior to discharge.

## 2023-05-09 ENCOUNTER — HOME HEALTH ADMISSION (OUTPATIENT)
Dept: HOME HEALTH SERVICES | Facility: HOME HEALTH | Age: 58
End: 2023-05-09

## 2023-05-23 RX ORDER — TADALAFIL 2.5 MG/1
2.5 TABLET ORAL DAILY
COMMUNITY
Start: 2022-08-29

## 2023-05-23 RX ORDER — LOVASTATIN 20 MG/1
2 TABLET ORAL NIGHTLY
COMMUNITY
Start: 2022-08-29

## 2023-05-23 RX ORDER — SPIRONOLACTONE 25 MG/1
25 TABLET ORAL DAILY
COMMUNITY
Start: 2022-08-29

## 2023-05-23 RX ORDER — BUPROPION HYDROCHLORIDE 150 MG/1
1 TABLET ORAL EVERY MORNING
COMMUNITY
Start: 2022-08-29

## 2023-05-23 RX ORDER — SACUBITRIL AND VALSARTAN 97; 103 MG/1; MG/1
1 TABLET, FILM COATED ORAL 2 TIMES DAILY
COMMUNITY
Start: 2022-08-29

## 2023-05-23 RX ORDER — CARVEDILOL 12.5 MG/1
12.5 TABLET ORAL 2 TIMES DAILY WITH MEALS
COMMUNITY
Start: 2022-08-29

## 2023-05-23 RX ORDER — BUMETANIDE 0.5 MG/1
TABLET ORAL
COMMUNITY
Start: 2022-08-29

## 2023-05-23 RX ORDER — CETIRIZINE HYDROCHLORIDE 10 MG/1
10 TABLET ORAL DAILY
COMMUNITY
Start: 2022-08-29

## 2023-05-23 RX ORDER — FLUTICASONE PROPIONATE 50 MCG
2 SPRAY, SUSPENSION (ML) NASAL DAILY
COMMUNITY
Start: 2022-08-29

## 2023-05-23 RX ORDER — GABAPENTIN 400 MG/1
400 CAPSULE ORAL 3 TIMES DAILY
COMMUNITY
Start: 2022-08-29

## 2023-05-23 RX ORDER — ASPIRIN 81 MG/1
81 TABLET, CHEWABLE ORAL DAILY
COMMUNITY
Start: 2022-08-29

## 2023-05-23 RX ORDER — METFORMIN HYDROCHLORIDE 500 MG/1
500 TABLET, EXTENDED RELEASE ORAL
COMMUNITY
Start: 2022-08-29

## 2023-05-23 RX ORDER — TRAZODONE HYDROCHLORIDE 50 MG/1
1 TABLET ORAL NIGHTLY
COMMUNITY
Start: 2022-08-29

## 2023-05-23 RX ORDER — IBUPROFEN 800 MG/1
TABLET ORAL
COMMUNITY
Start: 2022-07-18